# Patient Record
Sex: MALE | Race: WHITE | NOT HISPANIC OR LATINO | Employment: OTHER | URBAN - METROPOLITAN AREA
[De-identification: names, ages, dates, MRNs, and addresses within clinical notes are randomized per-mention and may not be internally consistent; named-entity substitution may affect disease eponyms.]

---

## 2017-08-14 ENCOUNTER — OFFICE VISIT (OUTPATIENT)
Dept: OPHTHALMOLOGY | Facility: CLINIC | Age: 67
End: 2017-08-14
Attending: OPHTHALMOLOGY
Payer: MEDICARE

## 2017-08-14 DIAGNOSIS — H52.4 MYOPIA WITH ASTIGMATISM AND PRESBYOPIA, BILATERAL: ICD-10-CM

## 2017-08-14 DIAGNOSIS — H04.123 BILATERAL DRY EYES: Primary | ICD-10-CM

## 2017-08-14 DIAGNOSIS — H01.00B BLEPHARITIS OF UPPER AND LOWER EYELIDS OF BOTH EYES, UNSPECIFIED TYPE: ICD-10-CM

## 2017-08-14 DIAGNOSIS — H52.13 MYOPIA WITH ASTIGMATISM AND PRESBYOPIA, BILATERAL: ICD-10-CM

## 2017-08-14 DIAGNOSIS — H52.203 MYOPIA WITH ASTIGMATISM AND PRESBYOPIA, BILATERAL: ICD-10-CM

## 2017-08-14 DIAGNOSIS — H01.00A BLEPHARITIS OF UPPER AND LOWER EYELIDS OF BOTH EYES, UNSPECIFIED TYPE: ICD-10-CM

## 2017-08-14 PROCEDURE — 68761 CLOSE TEAR DUCT OPENING: CPT | Mod: ZF | Performed by: OPHTHALMOLOGY

## 2017-08-14 PROCEDURE — 99213 OFFICE O/P EST LOW 20 MIN: CPT | Mod: ZF

## 2017-08-14 ASSESSMENT — EXTERNAL EXAM - RIGHT EYE: OD_EXAM: NORMAL

## 2017-08-14 ASSESSMENT — REFRACTION_WEARINGRX
OS_CYLINDER: +2.00
OD_AXIS: 150
OD_ADD: +2.50
OS_ADD: +2.50
OD_SPHERE: -3.25
OS_AXIS: 177
OD_CYLINDER: +0.75
OS_SPHERE: -5.75

## 2017-08-14 ASSESSMENT — EXTERNAL EXAM - LEFT EYE: OS_EXAM: NORMAL

## 2017-08-14 ASSESSMENT — VISUAL ACUITY
OD_CC: 20/20
METHOD: SNELLEN - LINEAR
OS_CC: 20/25

## 2017-08-14 ASSESSMENT — TONOMETRY
IOP_METHOD: TONOPEN
OS_IOP_MMHG: 22
OD_IOP_MMHG: 21

## 2017-08-14 ASSESSMENT — CONF VISUAL FIELD
OD_NORMAL: 1
OS_NORMAL: 1

## 2017-08-14 NOTE — MR AVS SNAPSHOT
After Visit Summary   8/14/2017    Karri Brady    MRN: [id number]           Patient Information     Date Of Birth          1950        Visit Information        Provider Department      8/14/2017 7:30 AM Pretty Lomax [facility]        Today's Diagnoses     Bilateral dry eyes    -  1    Myopia with astigmatism and presbyopia, bilateral        Blepharitis of upper and lower eyelids of both eyes, unspecified type           Follow-ups after your visit        Follow-up notes from your care team     Return in about 1 year (around 8/14/2018).      Who to contact     Please call your clinic at [phone] to:    Ask questions about your health    Make or cancel appointments    Discuss your medicines    Learn about your test results    Speak to your doctor   If you have compliments or concerns about an experience at your clinic, or if you wish to file a complaint, please contact [facility] Patient Relations at [phone] or email us at [email or web address]         Additional Information About Your Visit        MyChart Information     [employer] gives you secure access to your electronic health record. If you see a primary care provider, you can also send messages to your care team and make appointments. If you have questions, please call your primary care clinic.  If you do not have a primary care provider, please call [phone] and they will assist you.      [employer] is an electronic gateway that provides easy, online access to your medical records. With [employer], you can request a clinic appointment, read your test results, renew a prescription or communicate with your care team.     To access your existing account, please contact your [facility] or call [phone] for assistance.        Care EveryWhere ID     This is your Care EveryWhere ID. This could be used by other organizations to access your [facility]  records  [id number]         Blood Pressure from Last 3 Encounters:   04/30/13 116/64   04/23/13 116/74   03/27/13 118/60    Weight from Last 3 Encounters:   04/30/13 71.8 kg (158 lb 3.2 oz)   04/19/13 72.1 kg (158 lb 15.2 oz)   03/27/13 71.7 kg (158 lb)              We Performed the Following     Punctal Closure, Plugs          Today's Medication Changes          These changes are accurate as of: 8/14/17  9:04 AM.  If you have any questions, ask your nurse or doctor.               Start taking these medicines.        Dose/Directions    Lifitegrast 5 % Soln   Used for:  Bilateral dry eyes   Started by:  Pretty Lomax MD        Dose:  1 drop   Apply 1 drop to eye 2 times daily   Quantity:  1 each   Refills:  11         Stop taking these medicines if you haven't already. Please contact your care team if you have questions.     cycloSPORINE 0.05 % ophthalmic emulsion   Commonly known as:  RESTASIS   Stopped by:  Pretty Lomax MD                Where to get your medicines      Some of these will need a paper prescription and others can be bought over the counter.  Ask your nurse if you have questions.     Bring a paper prescription for each of these medications     Lifitegrast 5 % Soln                Primary Care Provider Office Phone # Fax #    Dom ELIZONDO Evelia Stone [phone]       [address] [address]        Equal Access to Services     [facility] Center[facility] AH: Hadii bethel verduzco hadasho Sowilfredali, waaxda luqadaha, qaybta kaalmada giada, waxsuyapa beba bush . So [facility] [phone].    ATENCIÓN: Si habla español, tiene a burr disposición servicios gratuitos de asistencia lingüística. Llame al [phone].    We comply with applicable federal civil rights laws and [place] laws. We do not discriminate on the basis of race, color, national origin, age, disability sex, sexual orientation or gender identity.            Thank you!     Thank you for  choosing EYE CLINIC  for your care. Our goal is always to provide you with excellent care. Hearing back from our patients is one way we can continue to improve our services. Please take a few minutes to complete the written survey that you may receive in the mail after your visit with us. Thank you!             Your Updated Medication List - Protect others around you: Learn how to safely use, store and throw away your medicines at www.disposemymeds.org.          This list is accurate as of: 8/14/17  9:04 AM.  Always use your most recent med list.                   Brand Name Dispense Instructions for use Diagnosis    DERMA-SMOOTHE/FS SCALP 0.01 % Oil     118 mL    Apply once daily at night to scalp, decrease to once weekly if you are not flaring    Dermatitis, seborrheic       ketoconazole 2 % cream    NIZORAL    15 g    Apply 3 x weekly to base of scalp when hair is wet and massage in, leave in place for approximately 5-10 minutes then wash out.    Dermatitis, seborrheic       Lifitegrast 5 % Soln     1 each    Apply 1 drop to eye 2 times daily    Bilateral dry eyes       SYSTANE OP      Apply 1 drop to eye as needed

## 2017-08-14 NOTE — NURSING NOTE
Chief Complaints and History of Present Illnesses   Patient presents with     Follow Up For     Blepharitis      HPI    Affected eye(s):  Both   Symptoms:     No foreign body sensation   Tearing   Itching   No burning       Other:  Numerous years    Frequency:  Intermittent, Constant       Do you have eye pain now?:  No      Comments:  Pt complains of itching eyes, a lot of tearing. Notes that he has been using Restasis drops with no relief. Has not used any eye medications since 4-2017. Currently not using warm compresses, hard to do daily basis. Vision is good, happy with current glasses. [facility]5 months [facility]. Leaving for [place] next week. Would like another opinion on itchy eye lids. ANDRES VALENTIN, COA 7:54 AM 08/14/2017

## 2017-08-14 NOTE — PROGRESS NOTES
HPI  Karri Brady is a 66 year old male here for follow-up of blepharitis. Reports symptoms as itching starting in 2013 along with scalp itching, scalp itching has passed, persistent eye itching OS > OD, intermittent. Used restasis in the past, it was not helping and it was starting to burn. Used ATs, stopped because it was not helping. Warm compress helps soothe eyes, though he is not consistent in its use. Denies new floaters or flashing lights.      POH:   - Refractive error  - glaucoma suspect   - vertical misalignment s/p left eye correction 1975   PMH: Rosacea, seborrheic dermatitis. No diabetes or HTN  FH: No AMD or glc  SH: Former smoker  Ocular meds: none     Assessment & Plan    (H04.123) Bilateral dry eyes  (primary encounter diagnosis)  (H01.001,  H01.005,  H01.004,  H01.002) Blepharitis of upper and lower eyelids of both eyes, unspecified typeComment: Has attempted multiple treatments without much improvement, though has trouble with consistency   Plan: replace left punctual plug today (0.8 mm LLL), start ATs BID, warm compress BID  He has not tolerated Restasis (burning without improvement in symptoms), can try Xiidra BID - discount card given, also [facility]    (H52.13,  H52.203,  H52.4) Myopia with astigmatism and presbyopia, bilateral  Comment: good vision today  Plan: Monitor     He spends most of the year in [place] and is followed at the [facility].  Return to general clinic here when he is back in town in 1 year.      Teaching statement:  Complete documentation of historical and exam elements from today's encounter can be found in the full encounter summary report (not reduplicated in this progress note). I personally obtained the chief complaint(s) and history of present illness.  I confirmed and edited as necessary the review of systems, past medical/surgical history, family history, social history, and examination findings as documented by others; and I examined  the patient myself. I personally reviewed the relevant tests, images, and reports as documented above.     I formulated and edited as necessary the assessment and plan and discussed the findings and management plan with the patient and family.      Pretty Lomax MD  Comprehensive Ophthalmology & Ocular Pathology  Department of Ophthalmology and Visual Neurosciences  [email or web address]  Pager [phone]

## 2018-10-10 ENCOUNTER — RECORDS - HEALTHEAST (OUTPATIENT)
Dept: ADMINISTRATIVE | Facility: OTHER | Age: 68
End: 2018-10-10

## 2018-10-31 ENCOUNTER — RECORDS - HEALTHEAST (OUTPATIENT)
Dept: ADMINISTRATIVE | Facility: OTHER | Age: 68
End: 2018-10-31

## 2018-12-11 ENCOUNTER — TRANSFERRED RECORDS (OUTPATIENT)
Dept: HEALTH INFORMATION MANAGEMENT | Facility: CLINIC | Age: 68
End: 2018-12-11

## 2018-12-13 ENCOUNTER — RECORDS - HEALTHEAST (OUTPATIENT)
Dept: ADMINISTRATIVE | Facility: OTHER | Age: 68
End: 2018-12-13

## 2018-12-22 ENCOUNTER — TRANSFERRED RECORDS (OUTPATIENT)
Dept: HEALTH INFORMATION MANAGEMENT | Facility: CLINIC | Age: 68
End: 2018-12-22

## 2019-01-14 ENCOUNTER — TRANSFERRED RECORDS (OUTPATIENT)
Dept: HEALTH INFORMATION MANAGEMENT | Facility: CLINIC | Age: 69
End: 2019-01-14

## 2019-01-14 ENCOUNTER — RECORDS - HEALTHEAST (OUTPATIENT)
Dept: ADMINISTRATIVE | Facility: OTHER | Age: 69
End: 2019-01-14

## 2019-01-16 ENCOUNTER — RECORDS - HEALTHEAST (OUTPATIENT)
Dept: ADMINISTRATIVE | Facility: OTHER | Age: 69
End: 2019-01-16
Payer: MEDICARE

## 2019-02-11 ENCOUNTER — RECORDS - HEALTHEAST (OUTPATIENT)
Dept: ADMINISTRATIVE | Facility: OTHER | Age: 69
End: 2019-02-11

## 2019-02-13 ENCOUNTER — TRANSFERRED RECORDS (OUTPATIENT)
Dept: HEALTH INFORMATION MANAGEMENT | Facility: CLINIC | Age: 69
End: 2019-02-13

## 2019-02-27 ENCOUNTER — RECORDS - HEALTHEAST (OUTPATIENT)
Dept: ADMINISTRATIVE | Facility: OTHER | Age: 69
End: 2019-02-27

## 2019-05-29 ENCOUNTER — COMMUNICATION - HEALTHEAST (OUTPATIENT)
Dept: SCHEDULING | Facility: CLINIC | Age: 69
End: 2019-05-29

## 2019-05-29 ENCOUNTER — RECORDS - HEALTHEAST (OUTPATIENT)
Dept: ADMINISTRATIVE | Facility: OTHER | Age: 69
End: 2019-05-29

## 2019-06-03 ENCOUNTER — OFFICE VISIT - HEALTHEAST (OUTPATIENT)
Dept: FAMILY MEDICINE | Facility: CLINIC | Age: 69
End: 2019-06-03

## 2019-06-03 DIAGNOSIS — Z01.818 ENCOUNTER FOR PREOPERATIVE EXAMINATION FOR GENERAL SURGICAL PROCEDURE: ICD-10-CM

## 2019-06-03 DIAGNOSIS — E78.49 OTHER HYPERLIPIDEMIA: ICD-10-CM

## 2019-06-03 DIAGNOSIS — G56.01 CARPAL TUNNEL SYNDROME OF RIGHT WRIST: ICD-10-CM

## 2019-06-03 LAB
ALBUMIN SERPL-MCNC: 4.3 G/DL (ref 3.5–5)
ALP SERPL-CCNC: 66 U/L (ref 45–120)
ALT SERPL W P-5'-P-CCNC: 63 U/L (ref 0–45)
ANION GAP SERPL CALCULATED.3IONS-SCNC: 11 MMOL/L (ref 5–18)
AST SERPL W P-5'-P-CCNC: 41 U/L (ref 0–40)
BILIRUB SERPL-MCNC: 0.5 MG/DL (ref 0–1)
BUN SERPL-MCNC: 14 MG/DL (ref 8–22)
CALCIUM SERPL-MCNC: 9.7 MG/DL (ref 8.5–10.5)
CHLORIDE BLD-SCNC: 104 MMOL/L (ref 98–107)
CHOLEST SERPL-MCNC: 156 MG/DL
CO2 SERPL-SCNC: 24 MMOL/L (ref 22–31)
CREAT SERPL-MCNC: 0.82 MG/DL (ref 0.7–1.3)
FASTING STATUS PATIENT QL REPORTED: YES
GFR SERPL CREATININE-BSD FRML MDRD: >60 ML/MIN/1.73M2
GLUCOSE BLD-MCNC: 88 MG/DL (ref 70–125)
HDLC SERPL-MCNC: 44 MG/DL
LDLC SERPL CALC-MCNC: 100 MG/DL
POTASSIUM BLD-SCNC: 4.3 MMOL/L (ref 3.5–5)
PROT SERPL-MCNC: 7.1 G/DL (ref 6–8)
SODIUM SERPL-SCNC: 139 MMOL/L (ref 136–145)
TRIGL SERPL-MCNC: 60 MG/DL

## 2019-06-03 ASSESSMENT — MIFFLIN-ST. JEOR: SCORE: 1489.92

## 2019-06-05 ENCOUNTER — AMBULATORY - HEALTHEAST (OUTPATIENT)
Dept: FAMILY MEDICINE | Facility: CLINIC | Age: 69
End: 2019-06-05

## 2019-06-05 DIAGNOSIS — R74.8 ELEVATED LIVER ENZYMES: ICD-10-CM

## 2019-06-10 ENCOUNTER — COMMUNICATION - HEALTHEAST (OUTPATIENT)
Dept: SCHEDULING | Facility: CLINIC | Age: 69
End: 2019-06-10

## 2019-06-18 ENCOUNTER — RECORDS - HEALTHEAST (OUTPATIENT)
Dept: ADMINISTRATIVE | Facility: OTHER | Age: 69
End: 2019-06-18

## 2019-07-11 ENCOUNTER — COMMUNICATION - HEALTHEAST (OUTPATIENT)
Dept: SCHEDULING | Facility: CLINIC | Age: 69
End: 2019-07-11

## 2019-07-12 ENCOUNTER — OFFICE VISIT - HEALTHEAST (OUTPATIENT)
Dept: FAMILY MEDICINE | Facility: CLINIC | Age: 69
End: 2019-07-12

## 2019-07-12 DIAGNOSIS — R74.8 ELEVATED LIVER ENZYMES: ICD-10-CM

## 2019-07-12 DIAGNOSIS — R07.81 RIB PAIN ON RIGHT SIDE: ICD-10-CM

## 2019-07-12 ASSESSMENT — MIFFLIN-ST. JEOR: SCORE: 1515.61

## 2019-07-15 ENCOUNTER — COMMUNICATION - HEALTHEAST (OUTPATIENT)
Dept: FAMILY MEDICINE | Facility: CLINIC | Age: 69
End: 2019-07-15

## 2019-07-16 ENCOUNTER — COMMUNICATION - HEALTHEAST (OUTPATIENT)
Dept: FAMILY MEDICINE | Facility: CLINIC | Age: 69
End: 2019-07-16

## 2019-07-17 ENCOUNTER — AMBULATORY - HEALTHEAST (OUTPATIENT)
Dept: FAMILY MEDICINE | Facility: CLINIC | Age: 69
End: 2019-07-17

## 2019-07-17 DIAGNOSIS — R53.83 FATIGUE, UNSPECIFIED TYPE: ICD-10-CM

## 2019-07-17 DIAGNOSIS — Z91.018 FOOD ALLERGY: ICD-10-CM

## 2019-07-21 ENCOUNTER — COMMUNICATION - HEALTHEAST (OUTPATIENT)
Dept: FAMILY MEDICINE | Facility: CLINIC | Age: 69
End: 2019-07-21

## 2019-08-09 ENCOUNTER — TRANSFERRED RECORDS (OUTPATIENT)
Dept: HEALTH INFORMATION MANAGEMENT | Facility: CLINIC | Age: 69
End: 2019-08-09

## 2019-08-21 ENCOUNTER — RECORDS - HEALTHEAST (OUTPATIENT)
Dept: ADMINISTRATIVE | Facility: OTHER | Age: 69
End: 2019-08-21

## 2019-10-02 ENCOUNTER — HEALTH MAINTENANCE LETTER (OUTPATIENT)
Age: 69
End: 2019-10-02

## 2019-10-16 ENCOUNTER — RECORDS - HEALTHEAST (OUTPATIENT)
Dept: ADMINISTRATIVE | Facility: OTHER | Age: 69
End: 2019-10-16

## 2019-10-18 ENCOUNTER — RECORDS - HEALTHEAST (OUTPATIENT)
Dept: ADMINISTRATIVE | Facility: OTHER | Age: 69
End: 2019-10-18

## 2019-10-22 ENCOUNTER — TRANSFERRED RECORDS (OUTPATIENT)
Dept: HEALTH INFORMATION MANAGEMENT | Facility: CLINIC | Age: 69
End: 2019-10-22

## 2019-12-15 ENCOUNTER — HEALTH MAINTENANCE LETTER (OUTPATIENT)
Age: 69
End: 2019-12-15

## 2020-02-26 ENCOUNTER — TRANSFERRED RECORDS (OUTPATIENT)
Dept: HEALTH INFORMATION MANAGEMENT | Facility: CLINIC | Age: 70
End: 2020-02-26

## 2020-03-30 ENCOUNTER — COMMUNICATION - HEALTHEAST (OUTPATIENT)
Dept: FAMILY MEDICINE | Facility: CLINIC | Age: 70
End: 2020-03-30

## 2020-03-30 DIAGNOSIS — R12 HEARTBURN: ICD-10-CM

## 2020-04-22 ENCOUNTER — COMMUNICATION - HEALTHEAST (OUTPATIENT)
Dept: FAMILY MEDICINE | Facility: CLINIC | Age: 70
End: 2020-04-22

## 2020-04-22 ENCOUNTER — TRANSFERRED RECORDS (OUTPATIENT)
Dept: HEALTH INFORMATION MANAGEMENT | Facility: CLINIC | Age: 70
End: 2020-04-22

## 2020-04-22 DIAGNOSIS — E04.1 THYROID NODULE: ICD-10-CM

## 2020-04-23 ENCOUNTER — COMMUNICATION - HEALTHEAST (OUTPATIENT)
Dept: ADMINISTRATIVE | Facility: CLINIC | Age: 70
End: 2020-04-23

## 2020-04-23 ENCOUNTER — TRANSCRIBE ORDERS (OUTPATIENT)
Dept: OTHER | Age: 70
End: 2020-04-23

## 2020-04-23 DIAGNOSIS — E04.1 THYROID NODULE: Primary | ICD-10-CM

## 2020-04-24 ENCOUNTER — TELEPHONE (OUTPATIENT)
Dept: ENDOCRINOLOGY | Facility: CLINIC | Age: 70
End: 2020-04-24

## 2020-04-24 NOTE — TELEPHONE ENCOUNTER
[facility] Call Center    Phone Message    May a detailed message be left on voicemail: yes    Reason for Call: Other: NP referred t Endocrinology for Thyroid Noldule referred by Dom Stone [facility]    Action Taken: Message routed to:  [facility] Endocrinology    Travel Screening: Not Applicable

## 2020-04-27 NOTE — TELEPHONE ENCOUNTER
RECORDS RECEIVED FROM: External   DATE RECEIVED: 4/29/20   NOTES (FOR ALL VISITS) STATUS DETAILS   OFFICE NOTES from referring provider [facility]  4/22/20 encounter   OFFICE NOTES from other specialist N/A    ED NOTES N/A    OPERATIVE REPORT  (thyroid, pituitary, adrenal, parathyroid) N/A    MEDICATION LIST Care Everywhere    IMAGING      DEXASCAN N/A    MRI (BRAIN) N/A    XR (Chest) N/A    CT (HEAD/NECK/CHEST/ABDOMEN) N/A    NUCLEAR  N/A    ULTRASOUND (HEAD/NECK) N/A    LABS     DIABETES: HBGA1C, CREATININE, FASTING LIPIDS, MICROALBUMIN URINE, POTASSIUM, TSH, T4    THYROID: TSH, T4, CBC, THYRODLONULIN, TOTAL T3, FREE T4, CALCITONIN, CEA Care Everywhere   [facility]:  6/3/19

## 2020-04-27 NOTE — TELEPHONE ENCOUNTER
CLINIC COORDINATOR SCHEDULING NOTES    CALL RESULT: spoke with pt, confirmed appt    APPT TYPE: VIDEO VISIT NEW    PROVIDER: Patel SANTANA APPT NEEDED: scheduled for 4/28 at 5pm

## 2020-04-28 RX ORDER — ATORVASTATIN CALCIUM 40 MG/1
80 TABLET, FILM COATED ORAL DAILY
COMMUNITY

## 2020-04-28 RX ORDER — MELOXICAM 7.5 MG/1
TABLET ORAL PRN
COMMUNITY
Start: 2019-11-26

## 2020-04-28 RX ORDER — GABAPENTIN 300 MG/1
600 CAPSULE ORAL
COMMUNITY

## 2020-04-28 NOTE — PROGRESS NOTES
"Karri Brady is a 69 year old male who is being evaluated via a billable video visit.      The patient has been notified of following:     \"This video visit will be conducted via a call between you and your physician/provider. We have found that certain health care needs can be provided without the need for an in-person physical exam.  This service lets us provide the care you need with a video conversation.  If a prescription is necessary we can send it directly to your pharmacy.  If lab work is needed we can place an order for that and you can then stop by our lab to have the test done at a later time.    Video visits are billed at different rates depending on your insurance coverage.  Please reach out to your insurance provider with any questions.    If during the course of the call the physician/provider feels a video visit is not appropriate, you will not be charged for this service.\"    Patient has given verbal consent for Video visit? Yes    How would you like to obtain your AVS? Vinodharliza    Patient would like the video invitation sent by: Send to e-mail at: [email or web address]      Video-Visit Details    Type of service:  Video Visit    Distant Location (provider location):  [facility] ENDOCRINOLOGY     Mode of Communication:  Video Conference via Vamsi Roy MA        "

## 2020-04-29 ENCOUNTER — VIRTUAL VISIT (OUTPATIENT)
Dept: ENDOCRINOLOGY | Facility: CLINIC | Age: 70
End: 2020-04-29
Attending: FAMILY MEDICINE
Payer: MEDICARE

## 2020-04-29 ENCOUNTER — PRE VISIT (OUTPATIENT)
Dept: ENDOCRINOLOGY | Facility: CLINIC | Age: 70
End: 2020-04-29

## 2020-04-29 ENCOUNTER — TELEPHONE (OUTPATIENT)
Dept: ENDOCRINOLOGY | Facility: CLINIC | Age: 70
End: 2020-04-29

## 2020-04-29 DIAGNOSIS — E04.1 THYROID NODULE: Primary | ICD-10-CM

## 2020-04-29 NOTE — PATIENT INSTRUCTIONS
[phone] is the clinic team phone number      Release of information for reports and  images from past studies including all thyroid US, labs, cytology     We will get the thyroid  US scheduled when radiology opens up post [employer] .  I will send the results through [employer].  You could also schedule a follow up telephone or video visit after you get the US so we can discuss the results.       INFORMATION FOR PATIENTS  THYROID NODULES AND   FINE NEEDLE ASPIRATION BIOPSY OF THE THYROID    The finding of a thyroid nodule is almost never an emergency.  Thyroid nodules are common, occurring in up to 50% of patients over the age of 50 years.      Innocent (not important enough to have been detected during life) thyroid cancer may be found in around 5% of people.   Likewise, about 5-10% of patients with demonstrated thyroid nodules will prove to have thyroid cancer if subjected to aggressive diagnostic measures.  As a rule, thyroid cancer has an excellent prognosis.  Therefore, in each patient with thyroid nodules, the decision has to be made about how important it is to identify and treat a cancer, if present.      When we find nodules on the thyroid we use ultrasound and either palpation or ultrasound guided biopsy to help determine which patients, from the large number with nodules on the thyroid, are in the group containing an important thyroid cancer.      Ultrasound Guided Fine Needle Aspiration Biopsy of the Thyroid uses the ultrasound eye to see the nodule and the needle during the biopsy procedure.  This procedure is performed in the radiology department.  The radiologist uses a small needle to remove cells from the specific area of the thyroid. The cells are then analyzed under the microscope to determine whether or not they might be cancer.      The results of thyroid biopsy come in 4 categories    1. Benign or negative for cancer.  This is most common result, found in approximately 60-70% of biopsy specimens.   There remains a 5% chance that this result is wrong.    2. Positive for cancer.  This is found about 5 % of the time. There remains a  1% chance that cancer is not present when we make this diagnosis by biopsy. This result leads to a recommendation for surgery to make the final diagnosis of cancer.     3. Indeterminate, or the grey zone.  This is found in approximately 20% of patients.  This diagnosis identifies a higher risk group, often resulting in diagnostic surgery to establish the final diagnosis.  If all patients in this group go to surgery, only 10-20%, on average, prove to have cancer.     4. Insufficient for diagnosis. This is found in 5-10% of biopsies. When this occurs we need to repeat the biopsy.      The greatest risk of thyroid biopsy is that the result is not clear (that it is in the indeterminate grey zone group), resulting in future thyroid surgery for what is likely to be benign thyroid disease.  There is a small risk of bleeding or infection.      If your doctor has recommended you have an ultrasound guided fine needle aspiration biopsy of the thyroid, your appointment may be scheduled by calling [phone].  Be sure to specify that the procedure is to be ultrasound-guided and that it is to be scheduled in radiology*

## 2020-04-29 NOTE — PROGRESS NOTES
"Endocrine Consult video visitnote    Attending Assessment/Plan :     History of thyroid nodules . We do not have any of the relevant records.  He is currently asymptomatic.   This is not currently urgent and it should wait until he can more safely get the study  [facility] / images from past studies including thyroid US, labs, cytology   Thyroid US here post covid - see me afterwards    Due to the COVID 19 pandemic this visit was converted to a telephone visit in order to help prevent spread of infection in this high risk patient and the general population. The patient gave verbal consent for the telephone visit today.    Start time 1700 AMwel -multiple tries without connection ; doximity  508 - connected.   Stop time 1733  Total time 33 minutes     Tabatha Sweeney MD    Chief complaint:  Karri is a 69 year old male seen in consultation at the request of Dr Dom Altamirano [facility]   for thyroid nodule .  I have reviewed Care Everywhere including  [facility] lab reports, imaging reports and provider notes as indicated.      HISTORY OF PRESENT ILLNESS  Karri says the thyroid  Was initially being followed at the [facility] .  He has had thyroid tests including US.  He has 2 \"enlarged nodes\" .  He had FNAB in [place].  He was getting follow up every 6-12 months.  The last US showed that one has grown a little bit. He last had thyroid US at the [facility] around one year ago.  He has never had FNAB at the [facility].    One week ago [facility] He is not satisfied with their speed of anticipated follow up US, current delays due to COVID.  He is also seeking a second opinion.      We have the following labs:    4/26/13: Ca 8.4, creatinine 0.65    There are no thyroid images on the system    He had no known childhood radiation exposure.  A Sister had a thyorid operation. He doesn't think she had cancer but he isn't sure.  He had tonsillectomy 1960 age 10    REVIEW OF SYSTEMS  No voice symptoms  No " throat symptoms  Occasional slight irritation of the throat  Swallow is OK   He cannot see the lumps in the mirror  He can't feel them with his hand  Chronic pain condition  Pain going up arm into shoulder  TC neurology started gabapentin which helped the chronic pain issue  Scheduled for hand surgery on 5/11/2020- he will get COVID test in preparation.  The surgery will be at [address] [place] [address] [place]  Gets trigger fingers x 6-7 in the past - hands are extremely painful   He had recent trial of hydroxychloroquin for possible RA.   -- doesn't think he has RA, has seen a few rheumatologists  10 system ROS otherwise as per the HPI or negative    Past Medical History  Past Medical History:   Diagnosis Date     Hyperlipidemia      Impaired glucose tolerance      Injury, other and unspecified, unspecified site 1998    Fracture left distal radius,Achilles tendon rupture.     Osteoarthritis      Overweight(278.02)      Strabismus      Thyroid nodule      Past Surgical History:   Procedure Laterality Date     ARTHROPLASTY MINIMALLY INVASIVE HIP  4/19/2013    Procedure: ARTHROPLASTY MINIMALLY INVASIVE HIP;  Right Two Incision Total Hip Arthroplasty;  Surgeon: Steve Lopez MD;  Location: [facility] OR     C LEG/ANKLE SURGERY PROC UNLISTED      Ankle arthroscopy, diagnostic     C SHLDR ARTHROSCOP,DIAGNOSTIC      Right shoulder     C SHOULDER SURG PROC UNLISTED      Shoulder Procedure Unlisted     HC CORRECT BUNION,SIMPLE      Bunionectomy, NOS     HC REMOVAL HEEL SPUR, CALCANEUS       HC REPAIR ACHILLES TENDON,PRIMARY       HC REPAIR OF NASAL SEPTUM       left hip replacement      2006 by Dr Lopez     REMOVAL OF SPERM DUCT(S)  1985    Vasectomy     STRABISMUS SURGERY       TONSILLECTOMY  1960     Medications    Current Outpatient Medications   Medication Sig Dispense Refill     atorvastatin (LIPITOR) 40 MG tablet Take 80 mg by mouth daily       gabapentin (NEURONTIN) 300 MG capsule TAKE 2 CAPSULES BY MOUTH TWICE A DAY  AND TAKE 3  "CAPSULES BY MOUTH AT BEDTIME       meloxicam (MOBIC) 7.5 MG tablet as needed       Allergies  Allergies   Allergen Reactions     Bee Venom Swelling     No Known Drug Allergies      Family History  family history includes Amblyopia in his son; Arthritis in his maternal grandmother; Cancer in his maternal grandfather; Cardiovascular in his father and maternal grandfather; Cerebrovascular Disease in his father; Rheumatic fever in his mother; Rheumatoid Arthritis in his mother; Thyroid Disease in his sister.    Social History  Social History     Tobacco Use     Smoking status: Former Smoker     Packs/day: 2.00     Years: 5.00     Pack years: 10.00     Types: Cigarettes     Smokeless tobacco: Never Used     Tobacco comment: quit at age 22   Substance Use Topics     Alcohol use: Yes     Comment: ocassionally     Drug use: No    8/2018 returned from [place]    Physical Exam  There were no vitals taken for this visit.  There is no height or weight on file to calculate BMI.   BP Readings from Last 1 Encounters:   04/30/13 116/64      Pulse Readings from Last 1 Encounters:   04/30/13 56      Resp Readings from Last 1 Encounters:   04/30/13 12      Temp Readings from Last 1 Encounters:   04/30/13 99  F (37.2  C) (Oral)      SpO2 Readings from Last 1 Encounters:   04/23/13 98%      Wt Readings from Last 1 Encounters:   04/30/13 71.8 kg (158 lb 3.2 oz)      Ht Readings from Last 1 Encounters:   04/30/13 1.676 m (5' 6\")     GENERAL: healthy, alert and no distress; normal voice  EYES: Eyes grossly normal to inspection, conjunctivae and sclerae normal  NECK: no visible suggestion of goiter  RESP: no audible wheeze, cough, or visible cyanosis.  No visible retractions or increased work of breathing.  Able to speak fully in complete sentences.  NEURO: Cranial nerves grossly intact, mentation intact and speech normal  PSYCH: mentation appears normal, affect normal/bright, normal speech and appearance well-groomed          "

## 2020-04-29 NOTE — TELEPHONE ENCOUNTER
[facility] Call Center    Phone Message    May a detailed message be left on voicemail: no     Reason for Call: Other: . [facility] and [facility] . Thank you     Action Taken: Message routed to:  [facility] endo    Travel Screening: Not Applicable

## 2020-05-04 ENCOUNTER — OFFICE VISIT - HEALTHEAST (OUTPATIENT)
Dept: FAMILY MEDICINE | Facility: CLINIC | Age: 70
End: 2020-05-04

## 2020-05-04 DIAGNOSIS — Z01.818 PREOP GENERAL PHYSICAL EXAM: ICD-10-CM

## 2020-05-04 DIAGNOSIS — M65.30 TRIGGER FINGER, ACQUIRED: ICD-10-CM

## 2020-05-04 DIAGNOSIS — G62.9 PERIPHERAL POLYNEUROPATHY: ICD-10-CM

## 2020-05-04 LAB
ANION GAP SERPL CALCULATED.3IONS-SCNC: 10 MMOL/L (ref 5–18)
ATRIAL RATE - MUSE: 53 BPM
BUN SERPL-MCNC: 11 MG/DL (ref 8–22)
CALCIUM SERPL-MCNC: 8.9 MG/DL (ref 8.5–10.5)
CHLORIDE BLD-SCNC: 106 MMOL/L (ref 98–107)
CO2 SERPL-SCNC: 23 MMOL/L (ref 22–31)
CREAT SERPL-MCNC: 0.71 MG/DL (ref 0.7–1.3)
DIASTOLIC BLOOD PRESSURE - MUSE: NORMAL
GFR SERPL CREATININE-BSD FRML MDRD: >60 ML/MIN/1.73M2
GLUCOSE BLD-MCNC: 87 MG/DL (ref 70–125)
HGB BLD-MCNC: 15.1 G/DL (ref 14–18)
INTERPRETATION ECG - MUSE: NORMAL
P AXIS - MUSE: 49 DEGREES
POTASSIUM BLD-SCNC: 4 MMOL/L (ref 3.5–5)
PR INTERVAL - MUSE: 154 MS
QRS DURATION - MUSE: 96 MS
QT - MUSE: 424 MS
QTC - MUSE: 397 MS
R AXIS - MUSE: 31 DEGREES
SODIUM SERPL-SCNC: 139 MMOL/L (ref 136–145)
SYSTOLIC BLOOD PRESSURE - MUSE: NORMAL
T AXIS - MUSE: 29 DEGREES
VENTRICULAR RATE- MUSE: 53 BPM

## 2020-05-04 ASSESSMENT — MIFFLIN-ST. JEOR: SCORE: 1486.13

## 2020-05-12 NOTE — TELEPHONE ENCOUNTER
Patient has signed [facility] records can be requested via fax at this fax number provided.I have requested for images to be pushed through PACS.

## 2020-05-22 ENCOUNTER — RECORDS - HEALTHEAST (OUTPATIENT)
Dept: ADMINISTRATIVE | Facility: OTHER | Age: 70
End: 2020-05-22

## 2020-06-29 ENCOUNTER — RECORDS - HEALTHEAST (OUTPATIENT)
Dept: ADMINISTRATIVE | Facility: OTHER | Age: 70
End: 2020-06-29

## 2020-07-09 ENCOUNTER — RECORDS - HEALTHEAST (OUTPATIENT)
Dept: ADMINISTRATIVE | Facility: OTHER | Age: 70
End: 2020-07-09

## 2020-07-10 ENCOUNTER — RECORDS - HEALTHEAST (OUTPATIENT)
Dept: ADMINISTRATIVE | Facility: OTHER | Age: 70
End: 2020-07-10
Payer: MEDICARE

## 2020-07-24 ENCOUNTER — RECORDS - HEALTHEAST (OUTPATIENT)
Dept: ADMINISTRATIVE | Facility: OTHER | Age: 70
End: 2020-07-24

## 2020-08-20 ENCOUNTER — RECORDS - HEALTHEAST (OUTPATIENT)
Dept: ADMINISTRATIVE | Facility: OTHER | Age: 70
End: 2020-08-20

## 2020-09-25 ENCOUNTER — RECORDS - HEALTHEAST (OUTPATIENT)
Dept: ADMINISTRATIVE | Facility: OTHER | Age: 70
End: 2020-09-25

## 2020-10-19 ENCOUNTER — COMMUNICATION - HEALTHEAST (OUTPATIENT)
Dept: FAMILY MEDICINE | Facility: CLINIC | Age: 70
End: 2020-10-19

## 2020-10-20 ENCOUNTER — COMMUNICATION - HEALTHEAST (OUTPATIENT)
Dept: FAMILY MEDICINE | Facility: CLINIC | Age: 70
End: 2020-10-20

## 2020-11-24 ENCOUNTER — TRANSFERRED RECORDS (OUTPATIENT)
Dept: HEALTH INFORMATION MANAGEMENT | Facility: CLINIC | Age: 70
End: 2020-11-24

## 2021-01-15 ENCOUNTER — HEALTH MAINTENANCE LETTER (OUTPATIENT)
Age: 71
End: 2021-01-15

## 2021-04-22 ENCOUNTER — TRANSFERRED RECORDS (OUTPATIENT)
Dept: HEALTH INFORMATION MANAGEMENT | Facility: CLINIC | Age: 71
End: 2021-04-22

## 2021-05-29 NOTE — TELEPHONE ENCOUNTER
"Triage note:    68 year old male called about generalized pain since 2019 and been progressively worsening. The pain is  in bilateral hands and arms. Right arm is worse than left. The pain also radiates into his legs as well. The pain was an   \"8\" on a scale of 1-10.  He has been taking both Tylenol almost 4000 mg daily and Ibuprofen (400 mg three times a day) which helped reduce the pain to a \"5-6\".  The pain is worse in the morning.     Just before this pain issue started [facility] and he developed a  'knotted\" muscle in his back early 2019.  He was treated with a  muscle relaxant which helped and the issue resolved.      He had a Pre op last Monday and learned that he had increased liver enzymes. He states he doesn't drink alcohol but has been using a lot of Tylenol for the pain.  He has been taking Acetaminophen almost 4000 mg daily for the last 4 weeks.      EMG showed right carpal tunnel and he had surgery Thursday. He stated that he got immediate relief from generalized body pain and arm pain that lasted until  morning. Now all the pain is back.  Both hands hurt a lot and right arm is 'weak' 'because of pain' he states.      He saw rheumatologist last 3 weeks ago.  His Rheumatologist advised him to see PCP.  He did the complete rheumatology assessment, including the EMG. The rheumatology tests were all normal. He is wondering if he has a pinched nerve somewhere? Or if he has fibromyalgia?      He will call the surgeon to discuss his symptoms postoperatively. He is asking PCP the followin.) what his PCP thinks the next steps would be?    2.)  Due to increased liver enzymes, what pain medicine should he take?  Ibuprofen was not adequate on it's own.      *Ok to leave a detailed message upon call back    Eleni Pascal RN, [facility] Refill/Triage, 6/10/2019 10:02 AM      "

## 2021-05-29 NOTE — TELEPHONE ENCOUNTER
Patient Returning Call  Reason for call:  Returning phone call.  Information relayed to patient: Below message relayed to patient.  Patient has additional questions:  No  If YES, what are your questions/concerns:  No additional questions at this time.  Okay to leave a detailed message?: No call back needed

## 2021-05-29 NOTE — TELEPHONE ENCOUNTER
New Appointment Needed  What is the reason for the visit:    Pre-Op Appt Request  When is the surgery? :  06/06/2019  Where is the surgery?:   [address] [place] [address] [facility]  Who is the surgeon? :  Dr. Valero  What type of surgery is being done?: Right carpal tunnel release  Provider Preference: Any available  How soon do you need to be seen?: Before 06/06/2019  Waitlist offered?: No  Okay to leave a detailed message:  Yes

## 2021-05-29 NOTE — TELEPHONE ENCOUNTER
Left message to call back for: Pt to call back back on mainline to schedule a Pre-op Physical   Information to relay to patient:  LVM for Pt to call back.  Provider is okay to use reserved slots on Monday for Pt to be seen.  See message below.

## 2021-05-29 NOTE — PATIENT INSTRUCTIONS - HE
Follow your surgeon's direction on when to stop eating and drinking prior to surgery.  Your surgeon will be managing your pain after your surgery.    Remove all jewelry and metal piercings before your surgery.

## 2021-05-29 NOTE — PROGRESS NOTES
Preoperative Exam    Scheduled Procedure: RT carpal tunnel  Surgery Date:  06/06/19  Surgery Location: [address] [place] [address] in [place]  [phone]   Surgeon:  Dr. Valero    Assessment/Plan:     1. Encounter for preoperative examination for general surgical procedure  He is cleared to proceed with the right carpal tunnel release surgery as scheduled on 6/6/2019.    2. Carpal tunnel syndrome of right wrist    3. Other hyperlipidemia  Continue atorvastatin 40 mg daily.  We are going to check his cholesterol and a CMP today.  - Comprehensive Metabolic Panel  - Lipid Cascade     Surgical Procedure Risk: Low (reported cardiac risk generally < 1%)  Have you had prior anesthesia?: Yes  Have you or any family members had a previous anesthesia reaction:  No  Do you or any family members have a history of a clotting or bleeding disorder?: No  Cardiac Risk Assessment: no increased risk for major cardiac complications    Patient approved for surgery with general or local anesthesia.        Functional Status: Independent  Patient plans to recover at home with family.     Subjective:      Karri Brady is a 68 y.o. male who presents for a preoperative consultation.  He has been struggling with worsening pain in the hands, right greater than left over the past few months.  He has also been developing numbness and tingling symptoms as well.  He was seen by neurology and had an EMG which showed nerve impingement consistent with carpal tunnel syndrome.  He is scheduled to have this surgically treated on 6/6/2019.  [facility] also [facility] [place].  [facility] and he is not sure when his last cholesterol panel was done.    All other systems reviewed and are negative, other than those listed in the HPI.    Pertinent History  Do you have difficulty breathing or chest pain after walking up a flight of stairs: No  History of obstructive  sleep apnea: No  Steroid use in the last 6 months: Yes: steroid injectio-back area in January.  Frequent Aspirin/NSAID use: Yes: To help with various muscle aches and pains  Prior Blood Transfusion: No  Prior Blood Transfusion Reaction: No  If for some reason prior to, during or after the procedure, if it is medically indicated, would you be willing to have a blood transfusion?:  There is no transfusion refusal.    Current Outpatient Medications   Medication Sig Dispense Refill     acetaminophen (TYLENOL) 325 MG tablet TAKE TWO TABLETS BY MOUTH EVERY 6 HOURS AS NEEDED FOR PAIN OR FEVER. *NOT TO EXCEED 4000MG IN 24 HOURS*       atorvastatin (LIPITOR) 40 MG tablet Take 40 mg by mouth daily.       No current facility-administered medications for this visit.         Allergies   Allergen Reactions     Venom-Honey Bee        Patient Active Problem List   Diagnosis     Bee Sting     Localized Osteoarthritis Of Multiple Sites     Hyperlipidemia     Impaired Fasting Glucose     Osteoarthritis Of The Hip     Foot Injury     Cerumen Impaction     Tinea Cruris     Itching (Pruritus)     Joint Pain, Localized In The Shoulder     Tingling (Paresthesia)     Dermatitis     Degenerative arthritis of thumb, right       No past medical history on file.    Past Surgical History:   Procedure Laterality Date     [place] REPAIR ACHILLES TENDON,PRIMARY      Description: Primary Repair Of Ruptured Achilles Tendon;  Recorded: 04/16/2014;       Social History     Socioeconomic History     Marital status:      Spouse name: Not on file     Number of children: Not on file     Years of education: Not on file     Highest education level: Not on file   Occupational History     Not on file   Social Needs     Financial resource strain: Not on file     Food insecurity:     Worry: Not on file     Inability: Not on file     Transportation needs:     Medical: Not on file     Non-medical: Not on file   Tobacco Use     Smoking status: Never Smoker      "Smokeless tobacco: Never Used   Substance and Sexual Activity     Alcohol use: Yes     Comment: rare     Drug use: No     Sexual activity: Not on file   Lifestyle     Physical activity:     Days per week: Not on file     Minutes per session: Not on file     Stress: Not on file   Relationships     Social connections:     Talks on phone: Not on file     Gets together: Not on file     Attends Orthodox service: Not on file     Active member of club or organization: Not on file     Attends meetings of clubs or organizations: Not on file     Relationship status: Not on file     Intimate partner violence:     Fear of current or ex partner: Not on file     Emotionally abused: Not on file     Physically abused: Not on file     Forced sexual activity: Not on file   Other Topics Concern     Not on file   Social History Narrative     Not on file             Objective:     Vitals:    06/03/19 1002   BP: 120/70   Pulse: (!) 56   Resp: 16   Temp: 98.6  F (37  C)   TempSrc: Oral   Weight: 174 lb 3 oz (79 kg)   Height: 5' 5.5\" (1.664 m)         Physical Exam:  General Appearance: Alert, cooperative, no distress, appears stated age  Head: Normocephalic, without obvious abnormality, atraumatic  Eyes: PERRL, conjunctiva/corneas clear, EOM's intact  Ears: Normal TM's and external ear canals, both ears  Nose: Nares normal, septum midline,mucosa normal, no drainage  Throat: Lips, mucosa, and tongue normal; teeth and gums normal  Neck: Supple, symmetrical, trachea midline, no adenopathy;  thyroid: not enlarged, symmetric, no tenderness/mass/nodules  Back: Symmetric, no curvature, ROM normal, no CVA tenderness  Lungs: Clear to auscultation bilaterally, respirations unlabored  Heart: Regular rate and rhythm, S1 and S2 normal, no murmur, rub, or gallop,  Abdomen: Soft, non-tender, bowel sounds active all four quadrants,  no masses, no organomegaly   Extremities: Extremities normal, atraumatic, no cyanosis or edema.  Hands are tender to " palpation.  Tinel's and Phalen's testing positive bilaterally radial and ulnar pulses 2 out of 4 bilaterally.  Skin: Skin color, texture, turgor normal, no rashes or lesions  Lymph nodes: Cervical, supraclavicular, and axillary nodes normal  Neurologic: He is alert.  Normal speech.  No focal deficits.  Normal deep tendon reflexes.   Psychiatric: He has a normal mood and affect.       Patient Instructions     Follow your surgeon's direction on when to stop eating and drinking prior to surgery.  Your surgeon will be managing your pain after your surgery.    Remove all jewelry and metal piercings before your surgery.           Labs:  Labs pending at this time.  Results will be reviewed when available.    Immunization History   Administered Date(s) Administered     Influenza, inj, historic,unspecified 01/01/1900     Tdap 08/01/2012     ZOSTER, LIVE 08/27/2014           Electronically signed by Dom Stone DO 06/03/19 10:04 AM

## 2021-05-29 NOTE — TELEPHONE ENCOUNTER
Left message to call back for: Karri  Information to relay to patient:  Please notify patient of Dr. Villasenor's message.

## 2021-05-29 NOTE — TELEPHONE ENCOUNTER
Please contact this patient and help him schedule an appointment with me for the preoperative exam.  I am fine with him using 1 of the reserves spots.  I see that there is one available on Monday morning.  Thank you, DE

## 2021-05-29 NOTE — TELEPHONE ENCOUNTER
I agree with his plan to follow-up with the hand surgeon about his bilateral hand and arm pain symptoms that are continuing despite his recent surgery.  I am not able to see in the chart whether or not he has had an MRI of the cervical spine.  I see that he has had an MRI of the lumbar spine, but I am not able to see the results.  His liver enzymes are most likely elevated from all of the Tylenol he has been taking.  He can switch to ibuprofen.  He could increase the dose to 600 mg up to 4 times daily as long as he takes it with food.  We will need to keep a close eye on his kidney function though.  The diagnosis of fibromyalgia was often made by rheumatology.  It looks like he had a full work-up by them that was unremarkable.  I recommend he start with following up with the hand specialist and go from there.  He may need to see a spine specialist as well if symptoms continue.  Thank you, DE

## 2021-05-30 NOTE — PROGRESS NOTES
Ultrasound results are normal, please call results to the patient or send a letter if not reachable by phone.

## 2021-05-30 NOTE — TELEPHONE ENCOUNTER
"Pt states \"I may have a broken rib.\"  R side.  Pain onset approx one week ago.  Correlates to physical therapy exercises for neck pain -> \"did lots of twisting and turning, left and right.\"  No breathing symptoms.  Pt states \"I seem to be falling apart in terms of new pains and new diagnoses of osteoarthritis in multiple places in my body.\"    Pt agrees to schedule clinical eval per triage disposition.  Warm transferred to a  for this purpose now.    Marlene Bueno RN BSBA  [facility] RN Triage     Reason for Disposition    [1] High-risk adult (e.g., age > 60, osteoporosis, chronic steroid use) AND [2] still hurts    Protocols used: CHEST INJURY - BENDING, LIFTING, OR [id number]      "

## 2021-05-30 NOTE — PROGRESS NOTES
HPI:  Karri Brady is a 68 y.o. male who is seen for   Chief Complaint   Patient presents with     Possible rib injury     x 1 wk to 10 day, right side rib to the back, Broken rib in september,    Karri Brady is seen after 10 days of new right rib pain.  He had a fracture on his right side in September of last year.  He now describes pain as lasting in the last 10 days, very tender over the lateral seventh rib with radiation to the back.  [facility]  [facility].  He is currently on gabapentin.  He had a recent EMG, for upper extremities, had carpal tunnel syndrome surgery on 6/6/2019 on the right side.  He has meloxicam which she takes occasionally.  He also has a muscle relaxer which he takes occasionally.  He states the muscle relaxer seems to help for the rib pain.  Patient denies chest pain, palpitations, shortness of breath, wheezing, cough, neck pain, back pain, dysuria, flank pain, abdominal pain, nausea, vomiting, diarrhea, constipation, black or bloody stools, acid reflux, lower leg edema, claudication, muscle weakness, dizziness, headaches, change in vision, changes in hearing, tinnitus, nasal congestion, fever, weight loss, globus, dysphagia, increased urination, increased thirst, depression, anxiety.  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, wasps and bees     Localized Osteoarthritis Of Multiple Sites     Other hyperlipidemia     Osteoarthritis Of The Hip     Joint Pain, Localized In The Shoulder     Degenerative arthritis of thumb, right     Family History   Problem Relation Age of Onset     Rheum arthritis Mother      Rheumatic fever Mother      Stroke Father      Heart disease Father      Social History     Socioeconomic History     Marital status:       Spouse name: None     Number of children: None     Years of education: None     Highest education level: None   Occupational History     None   Social Needs     Financial resource strain: None     Food insecurity:     Worry: None     Inability: None     Transportation needs:     Medical: None     Non-medical: None   Tobacco Use     Smoking status: Never Smoker     Smokeless tobacco: Never Used   Substance and Sexual Activity     Alcohol use: Yes     Comment: rare     Drug use: No     Sexual activity: None   Lifestyle     Physical activity:     Days per week: None     Minutes per session: None     Stress: None   Relationships     Social connections:     Talks on phone: None     Gets together: None     Attends Mu-ism service: None     Active member of club or organization: None     Attends meetings of clubs or organizations: None     Relationship status: None     Intimate partner violence:     Fear of current or ex partner: None     Emotionally abused: None     Physically abused: None     Forced sexual activity: None   Other Topics Concern     None   Social History Narrative     None     Past Surgical History:   Procedure Laterality Date     [place] REPAIR ACHILLES TENDON,PRIMARY      Description: Primary Repair Of Ruptured Achilles Tendon;  Recorded: 04/16/2014;     Current Outpatient Medications on File Prior to Visit   Medication Sig Dispense Refill     acetaminophen (TYLENOL) 325 MG tablet TAKE TWO TABLETS BY MOUTH EVERY 6 HOURS AS NEEDED FOR PAIN OR FEVER. *NOT TO EXCEED 4000MG IN 24 HOURS*       atorvastatin (LIPITOR) 40 MG tablet Take 40 mg by mouth daily.       No current facility-administered medications on file prior to visit.      Allergies   Allergen Reactions     Venom-Honey Bee        I have reviewed the patient's medical history in detail and updated the computerized patient record.  OBJECTIVE:  Wt Readings from Last 3 Encounters:   07/12/19 178 lb 1.6 oz (80.8 kg)   06/03/19 174 lb 3 oz (79  kg)   05/02/16 170 lb 6 oz (77.3 kg)     Temp Readings from Last 3 Encounters:   06/03/19 98.6  F (37  C) (Oral)   05/02/16 98.1  F (36.7  C) (Oral)   12/14/15 98.4  F (36.9  C) (Oral)     BP Readings from Last 3 Encounters:   07/12/19 132/82   06/03/19 120/70   05/02/16 112/66     Pulse Readings from Last 3 Encounters:   07/12/19 60   06/03/19 (!) 56   05/02/16 60     Body mass index is 28.75 kg/m .     Alert, cooperative, well-hydrated. Appears well.  Eyes: Pupils equal, round, reactive to light.  HEENT: Sclera white, nares patent, MMM, TM's pearly bilaterally  Neck: supple, without lymphadenopathy, Thyroid freely movable and without hypotrophy or nodularity.   Lungs: Clear to auscultation. No retractions, no increased work of respiration, equal chest rise.   Heart: Regular rate and rhythm, no murmurs, clicks,   Gallops.  Abdomen: Soft, bowel sounds in 4 quadrants with no tenderness to palpation, no organomegaly or masses, no aortic or renal bruits.  Extremities: no tenderness to palpation of gastrocnemius, bilaterally.  Skin: no increased warmth, edema, or erythema of lower legs bilaterally.  Back: No cervical, thoracic or lumbar tenderness to spinous processes or musculature.  Neuro::pupils equal and reactive to light bilaterally, CN II - XII grossly intact. No focal motor/sensory deficits. DTR 2/4 all 4 extremities. Muscle Strength 5/5 all extremities, Rhomberg negative  Labs:  Physical on 06/03/2019   Component Date Value     Sodium 06/03/2019 139      Potassium 06/03/2019 4.3      Chloride 06/03/2019 104      CO2 06/03/2019 24      Anion Gap, Calculation 06/03/2019 11      Glucose 06/03/2019 88      BUN 06/03/2019 14      Creatinine 06/03/2019 0.82      GFR MDRD Af Amer 06/03/2019 >60      GFR MDRD Non Af Amer 06/03/2019 >60      Bilirubin, Total 06/03/2019 0.5      Calcium 06/03/2019 9.7      Protein, Total 06/03/2019 7.1      Albumin 06/03/2019 4.3      Alkaline Phosphatase 06/03/2019 66      AST 06/03/2019  41*     ALT 06/03/2019 63*     Cholesterol 06/03/2019 156      Triglycerides 06/03/2019 60      HDL Cholesterol 06/03/2019 44      LDL Calculated 06/03/2019 100      Patient Fasting > 8hrs? 06/03/2019 Yes      ASSESMENT/PLAN:  1. Rib pain on right side  XR Ribs Right W PA Chest    XR Ribs Right W PA Chest    CANCELED: US Abdomen Limited   2. Elevated liver enzymes  US Abdomen Limited    US Abdomen Limited   Discussed the possibility of arthritis in this recently injured but not very recently injured rib.  Discussed new fracture, if there is a new fracture with minimal activity would be concerned about osteoporosis.  Would consider follow-up with his primary care for this testing if there is a new fracture.  We will call him with results of this testing.  Advised to continue deep breaths, with naproxen, Tylenol, or ibuprofen as needed for pain.  Continue all regular medications as prescribed.  We will do ultrasound of the liver to verify that there is not a gallbladder concern or new liver disease.  Follow-up with Dr. Altamirano and 2 weeks.  Malia Hernandez, MS, PA-C 07/15/19

## 2021-06-02 VITALS — HEIGHT: 66 IN | BODY MASS INDEX: 27.99 KG/M2 | WEIGHT: 174.19 LBS

## 2021-06-03 VITALS — HEIGHT: 66 IN | WEIGHT: 178.1 LBS | BODY MASS INDEX: 28.62 KG/M2

## 2021-06-04 VITALS
TEMPERATURE: 97.8 F | HEART RATE: 54 BPM | WEIGHT: 175.1 LBS | OXYGEN SATURATION: 97 % | HEIGHT: 65 IN | BODY MASS INDEX: 29.17 KG/M2 | SYSTOLIC BLOOD PRESSURE: 140 MMHG | DIASTOLIC BLOOD PRESSURE: 80 MMHG

## 2021-06-07 NOTE — TELEPHONE ENCOUNTER
Pt notified & provided [facility] endo phone# in case he doesn't hear from them in the next week or 2

## 2021-06-07 NOTE — TELEPHONE ENCOUNTER
Referral Request  Type of referral:   1. Endocrinologist     Who s requesting: Patient     Why the request: Patient states he has nodules on his thyroid and would like a 2nd opinion as 1 is growing .    Have you been seen for this request: N/A -  No documentation of this matter w/ PCP    Does patient have a preference on a group/provider?   [facility] - ENDOCRINOLOGY     Okay to leave a detailed message?  Yes

## 2021-06-07 NOTE — TELEPHONE ENCOUNTER
Left message to call back for: Karri  Information to relay to patient:  Patient has appointment for PreOP on 04/08/2020 with DE.  Is surgery still scheduled? If yes please help patient reschedule PreOP with another provider and location. Maybe he can see Dr. Canales at the [facility].  Thank you.

## 2021-06-07 NOTE — TELEPHONE ENCOUNTER
Medication Request  Medication name: Ranitidine   Requested Pharmacy: [facility] # [id number]  Reason for request: patient is requesting for gastric acid reflux   When did you use medication last?:  Unknown   Patient offered appointment:  N/A - electronic request  Okay to leave a detailed message: no

## 2021-06-07 NOTE — TELEPHONE ENCOUNTER
Authorizing: Dom Hairston, DO in [facility]             Diagnosis: Thyroid nodule [E04.1]     Please review chart and advise on if okay to schedule with [facility] or if patient should see [facility] with [facility] or [facility].     If with [facility] how soon? Okay for Video/phone visit?

## 2021-06-07 NOTE — PROGRESS NOTES
Preoperative Exam    Scheduled Procedure: 05/11/2020, trigger finger release on left hand at [facility] by Dr. Bernardo Valero  Surgery Date:  5/11/20  Surgery Location: [facility] - Fax: [phone]    Surgeon:  Dr. Bernardo Valero    Assessment/Plan:     1. Preop general physical exam    The patient is approved for the indicated surgery, with the proper anesthesia.  Patient should have no problems with this and I would expect a normal recovery.  Follow-up in their primary care clinic with any primary care needs after the surgery.  The necessary labs and tests were done today and reviewed by me.     - Electrocardiogram Perform and Read  - Hemoglobin  - Basic Metabolic Panel    2. Trigger finger, acquired      3. Peripheral polyneuropathy          Surgical Procedure Risk: Low (reported cardiac risk generally < 1%)  Have you had prior anesthesia?: Yes  Have you or any family members had a previous anesthesia reaction:  No  Do you or any family members have a history of a clotting or bleeding disorder?: No  Cardiac Risk Assessment: no increased risk for major cardiac complications    APPROVAL GIVEN to proceed with proposed procedure, without further diagnostic evaluation        Functional Status: Independent  Patient plans to recover at home alone.     Subjective:      Karri Brady is a 69 y.o. male who presents for a preoperative consultation.  Patient is having a trigger finger done, actually having 2 done.  He has had several done already so this seems to be a chronic problem for him.  He is having this done next week and they plan on doing COVID testing on him in a couple of days to verify that status.  He generally has no questions getting the surgery and that only been planned to put him to sleep.    All other systems reviewed and are negative, other than those listed in the HPI.    Pertinent History  Do you have difficulty breathing or chest pain after walking up a flight of stairs: No  History of obstructive sleep  apnea: No  Steroid use in the last 6 months: Yes: steroid injection in hand in Feb.   Frequent Aspirin/NSAID use: No  Prior Blood Transfusion: No  Prior Blood Transfusion Reaction: No  If for some reason prior to, during or after the procedure, if it is medically indicated, would you be willing to have a blood transfusion?:  There is no transfusion refusal.    Current Outpatient Medications   Medication Sig Dispense Refill     atorvastatin (LIPITOR) 40 MG tablet Take 40 mg by mouth daily.       gabapentin (NEURONTIN) 300 MG capsule TAKE 2 CAPSULES BY MOUTH TWICE A DAY  AND TAKE 3 CAPSULES BY MOUTH AT BEDTIME       meloxicam (MOBIC) 7.5 MG tablet 7.5 mg.       ranitidine (ZANTAC) 150 MG capsule Take 1 capsule (150 mg total) by mouth 2 (two) times a day as needed for heartburn. 180 capsule 3     No current facility-administered medications for this visit.         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    Past Surgical History:   Procedure Laterality Date     PA REPAIR ACHILLES TENDON,PRIMARY      Description: Primary Repair Of Ruptured Achilles Tendon;  Recorded: 04/16/2014;       Social History     Socioeconomic History     Marital status:      Spouse name: Not on file     Number of children: Not on file     Years of education: Not on file     Highest education level: Not on file   Occupational History     Not on file   Social Needs     Financial resource strain: Not on file     Food insecurity     Worry: Not on file     Inability: Not on file     Transportation needs     Medical: Not on file     Non-medical: Not  "on file   Tobacco Use     Smoking status: Never Smoker     Smokeless tobacco: Never Used   Substance and Sexual Activity     Alcohol use: Yes     Comment: rare     Drug use: No     Sexual activity: Not on file   Lifestyle     Physical activity     Days per week: Not on file     Minutes per session: Not on file     Stress: Not on file   Relationships     Social connections     Talks on phone: Not on file     Gets together: Not on file     Attends [employer]ism service: Not on file     Active member of club or organization: Not on file     Attends meetings of clubs or organizations: Not on file     Relationship status: Not on file     Intimate partner violence     Fear of current or ex partner: Not on file     Emotionally abused: Not on file     Physically abused: Not on file     Forced sexual activity: Not on file   Other Topics Concern     Not on file   Social History Narrative     Not on file       Patient Care Team:  Dom Hairston DO as PCP - General (Family Medicine)  Dom Hairston DO as Assigned PCP          Objective:     Vitals:    05/04/20 1427   BP: 140/80   Pulse: (!) 54   Temp: 97.8  F (36.6  C)   SpO2: 97%   Weight: 175 lb 1.6 oz (79.4 kg)   Height: 5' 5\" (1.651 m)         Physical Exam:    General: Awake, Alert and Cooperative   Head: Normocephalic and Atraumatic   Eyes: PERRL, EOMI.   ENT: Normal pearly TMs bilaterally and Oropharynx clear   Neck: Supple and Thyroid without enlargement or nodules   Chest: Chest wall normal   Lungs: Clear to auscultation bilaterally   Heart:: Regular rate and rhythm and no murmurs.  No significant LE edema.   Abdomen: Soft, nontender, nondistended and no hepatosplenomegaly   Musculoskeletal: Moving all extremities and No pain in the extremities   Neuro: Alert and oriented times 3 and Grossly normal   Skin: No rashes or lesions noted        There are no Patient Instructions on file for this visit.    EKG:  Normal sinus rhythm with no hyperacute " or acute changes    Labs:  Labs pending at this time.  Results will be reviewed when available.    Immunization History   Administered Date(s) Administered     Influenza high dose,seasonal,PF, 65+ yrs 12/05/2017     Influenza, Seasonal, Inj PF IIV3 09/27/2018     Pneumo Conj 13-V (2010&after) 09/16/2016     Pneumo Polysac 23-V 10/06/2017     Tdap 08/01/2012     ZOSTER, LIVE 08/22/2013, 08/27/2014     ZOSTER, RECOMBINANT, IM 04/11/2018, 02/13/2019, 05/02/2019           Electronically signed by Karri Shahid MD 05/04/20 2:35 PM

## 2021-06-07 NOTE — TELEPHONE ENCOUNTER
Appointment was for a trigger finger surgery, patient will call back when surgery is scheduled again, cancelled for now.    Vicky Merrill, CMA

## 2021-06-12 NOTE — TELEPHONE ENCOUNTER
Please contact this patient and let him know that the paperwork has been filled out and is ready to be picked up, faxed or mailed.  Please ask him how he would like to receive it.  Thank you, DE

## 2021-06-12 NOTE — TELEPHONE ENCOUNTER
Pt came into [facility] today to drop off form for DMV. Please complete form. When done, please call pt and he will come to .   Pt states he marked number 9 in the physicians section because of his spine sometimes pinching a nerve causing his left leg to be immobile. Pt says to call him with any questions.

## 2021-06-12 NOTE — TELEPHONE ENCOUNTER
Patient Returning Call  Reason for call:  Returning call  Information relayed to patient:    Relayed below message to patient.  Patient has additional questions:  Yes  If YES, what are your questions/concerns:    Patient states he will  paperwork today, 10/21/2020, at the clinic .  Okay to leave a detailed message?: Yes

## 2021-06-12 NOTE — TELEPHONE ENCOUNTER
Who is calling:  Patient   Reason for Call:  Patient is calling in to check  if clinic received his medical records from [facility] ? And also patient is going to drop off Disability form for parking permit at clinic today .  Date of last appointment with primary care: 05/04/20  Okay to leave a detailed message: No

## 2021-06-16 NOTE — TELEPHONE ENCOUNTER
Telephone Encounter by Neema Núñez LPN at 3/30/2020  3:10 PM     Author: Neema Núñez LPN Service: -- Author Type: Licensed Nurse    Filed: 3/30/2020  3:14 PM Encounter Date: 3/30/2020 Status: Signed    : Neema Núñez LPN (Licensed Nurse)       Patient Returning Call  Reason for call:  Patient returning call   Information relayed to patient:  Padma Belcher CMA           3/30/20 2:45 PM   Note      Left message to call back for: Karri  Information to relay to patient:  Patient has appointment for PreOP on 04/08/2020 with DE.  Is surgery still scheduled? If yes please help patient reschedule PreOP with another provider and location. Maybe he can see Dr. Canales at the [facility].  Thank you.      Patient has additional questions:  Yes  If YES, what are your questions/concerns:  Patient is questioning why Dr. Altamirano do his Pre-ope visit if he is not aviable on 4/08/20 patient would like to switch to another day in that week if not then patient will schedule with Dr. Canales at the [facility].  Okay to leave a detailed message?: No

## 2021-06-19 NOTE — LETTER
Letter by Malia Hernandez PA-C at      Author: Malia Hernandez PA-C Service: -- Author Type: --    Filed:  Encounter Date: 7/16/2019 Status: (Other)         Karri Brady  [address] [place]  [facility] [place] [address]             July 16, 2019         Dear Mr. Brady,    Below are the results from your recent visit:    Resulted Orders   US Abdomen Limited    Narrative    EXAM DATE:         07/16/2019    EXAM: US ABDOMEN LIMITED  LOCATION: [facility]  DATE/TIME: 7/16/2019 12:45 PM    INDICATION: elevated liver enzymes  COMPARISON: None.    FINDINGS:  GALLBLADDER: Normal, without cholelithiasis.  BILE DUCTS: No bile duct dilation. Common duct measures 3 mm.  LIVER: Slight increased echogenicity. There is a tiny subcapsular cyst in the  left lobe.  RIGHT KIDNEY: No hydronephrosis.  PANCREAS: Visualized pancreas is normal.    No ascites in the right upper quadrant.    CONCLUSION:  1.  Patient has mild hepatic steatosis. Incidental tiny liver cyst.  2.  No evidence of cholelithiasis or cholecystitis.                  Ultrasound results are normal.    Please call with questions or contact us using [employer].    Sincerely,        Electronically signed by Malia Hernandez PA-C

## 2021-06-24 ENCOUNTER — OFFICE VISIT - HEALTHEAST (OUTPATIENT)
Dept: FAMILY MEDICINE | Facility: CLINIC | Age: 71
End: 2021-06-24

## 2021-06-24 ENCOUNTER — COMMUNICATION - HEALTHEAST (OUTPATIENT)
Dept: SCHEDULING | Facility: CLINIC | Age: 71
End: 2021-06-24

## 2021-06-24 DIAGNOSIS — G89.29 OTHER CHRONIC PAIN: ICD-10-CM

## 2021-06-24 DIAGNOSIS — M25.561 PAIN IN RIGHT KNEE: ICD-10-CM

## 2021-06-24 DIAGNOSIS — M25.562 PAIN IN LEFT KNEE: ICD-10-CM

## 2021-06-24 DIAGNOSIS — M25.561 CHRONIC PAIN OF BOTH KNEES: ICD-10-CM

## 2021-06-24 DIAGNOSIS — M25.562 CHRONIC PAIN OF BOTH KNEES: ICD-10-CM

## 2021-06-24 DIAGNOSIS — G89.29 CHRONIC PAIN OF BOTH KNEES: ICD-10-CM

## 2021-06-24 NOTE — TELEPHONE ENCOUNTER
DIAGNOSIS: B knee pain , per pt , no recent images    APPOINTMENT DATE: 7.1.21   NOTES STATUS DETAILS   OFFICE NOTE from referring provider N/A    OFFICE NOTE from other specialist N/A    DISCHARGE SUMMARY from hospital N/A    DISCHARGE REPORT from the ER N/A    OPERATIVE REPORT N/A    EMG report N/A    MEDICATION LIST Internal    MRI In process    DEXA (osteoporosis/bone health) N/A    CT SCAN N/A    XRAYS (IMAGES & REPORTS) N/A      Action 6.24.21 AM DANIELE   Action Taken Called [facility] [phone] and left message. Faxed request to [phone]     Action 6.30.21 8:34 AM DANIELE   Action Taken Faxed urgent request to [facility] [phone]

## 2021-06-25 ENCOUNTER — COMMUNICATION - HEALTHEAST (OUTPATIENT)
Dept: INTERNAL MEDICINE | Facility: CLINIC | Age: 71
End: 2021-06-25

## 2021-06-26 NOTE — TELEPHONE ENCOUNTER
Pt called in ask x ray result from today's visit.  Inform the caller the provider will call after the review of the xray result.  Care advice given per protocol.  Patient agrees with care advice given.   Agreed to call back if he has additional symptoms or questions.      Neeraj Sparks RN, [facility] Triage/Med Refill 6/24/2021 5:04 PM

## 2021-06-26 NOTE — TELEPHONE ENCOUNTER
Dr. Soto    Patient is calling back and he would like to go over his xray results with someone.  He is wondering why he is still having pain.  He would like a call back at .

## 2021-06-26 NOTE — PATIENT INSTRUCTIONS - HE
I would suspect that your pain is most likely related to arthritis.  We will check xrays today and will let you know the results later today once the radiologist has read them.     I would recommend that you back off on the leg presses and consider adding in leg extension exercises to your regimen.      I would recommend the following providers here at the [facility], if you would like to establish care with a new primary care provider:     -Olga Mas DO (family medicine)     -AMBERLY Laguna (internal medicine)

## 2021-07-01 ENCOUNTER — PRE VISIT (OUTPATIENT)
Dept: ORTHOPEDICS | Facility: CLINIC | Age: 71
End: 2021-07-01

## 2021-07-03 NOTE — ADDENDUM NOTE
Addendum Note by Dom Gutierrez DO at 7/17/2019 10:00 AM     Author: Dom Gutierrez DO Service: -- Author Type: Physician    Filed: 7/17/2019 10:03 AM Encounter Date: 7/17/2019 Status: Signed    : Dom Gutierrez DO (Physician)    Addended by: DOM GUTIERREZ on: 7/17/2019 10:03 AM        Modules accepted: Orders

## 2021-07-04 NOTE — LETTER
Letter by Dom Hairston DO at      Author: Dom Hairston DO Service: -- Author Type: --    Filed:  Encounter Date: 6/25/2021 Status: (Other)         Karri Brady  [address] [place] [address]      June 25, 2021      Dear Mr. Brady,    Enclosed are exercises that could be performed at home to try to bring about improvement in your left knee pain.  Please let me know if things are not improving within the next 4-6 weeks, as at that point, you may want to consider imaging and/or physical theapy.     Sincerely,        Boni Soto MD

## 2021-07-06 VITALS — BODY MASS INDEX: 29.6 KG/M2 | SYSTOLIC BLOOD PRESSURE: 128 MMHG | WEIGHT: 177.9 LBS | DIASTOLIC BLOOD PRESSURE: 62 MMHG

## 2021-07-07 NOTE — TELEPHONE ENCOUNTER
Please apologize for the delayed results.   The xray showed mild arthritic changes present at the right knee, but no abnormalities at all on the left side.    Given this result, I would have to believe that the left sided medial knee pain is more likely related to a mild meniscus injury, based on location of pain.  Given yesterday's fairly normal exam, I would not think that it would likely require anything more aggressive than exercises and modification of activities, as we had discussed at the visit yesterday.  I will mail him home exercises to consider adding in but if it isn't responding to these measures within the next 4 weeks, I would recommend that he let me know so that we could reconsider referral to formal PT.   Thanks,  Boni Soto MD

## 2021-07-07 NOTE — TELEPHONE ENCOUNTER
Called patient and left message to call back for results of xray. When he calls back please relay results below from Dr. Soto.

## 2021-07-13 NOTE — PROGRESS NOTES
Progress Notes by Boni Soto MD at 6/24/2021  9:20 AM     Author: Boni Soto MD Service: -- Author Type: Physician    Filed: 7/13/2021  1:53 AM Encounter Date: 6/24/2021 Status: Signed    : Boni Soto MD (Physician)       Assessment:     1. Chronic pain of both knees (left > right)  XR KNEE BILATERAL AP STANDING W SUNRISE    XR KNEE BILATERAL AP STANDING W SUNRISE      Plan:     Xray of the right knee shows evidence of DJD, left was read as normal - which is somewhat surprising given crepitus noted on exam and patient's current symptoms.  It is possible that he suffered an acute meniscus injury but without any locking or instability noted, I would hesitate to advise further imaging with an MRI without first pursuing a course of physical therapy.  Patient is advised initially to pursue home exercises to help strengthen quadriceps muscles to take some of the stress off of the knee joints. If symptoms are not starting to improve with these exercises within 4 weeks, he is to let me know so that we can reconsider referral to see psychiatry.      Patient Instructions   I would suspect that your pain is most likely related to arthritis.  We will check xrays today and will let you know the results later today once the radiologist has read them.     I would recommend that you back off on the leg presses and consider adding in leg extension exercises to your regimen.      I would recommend the following providers here at the [facility], if you would like to establish care with a new primary care provider:     -Olga Mas DO (family medicine)     -AMBERLY Laguna (internal medicine)      Return in about 4 weeks (around 7/22/2021) for Follow up.     28 minutes spent on the date of the encounter doing chart review, history and exam, documentation and further activities per the note.      Subjective:      Karri Brady is a 70 y.o. male who presents with knee pain involving the left  knee. Onset was 3 weeks ago.  Inciting event: patient thinks that it may be impacted by increased activity (walking/ jogging/tennis ) and participating in PNB spine strengthening program. Current symptoms include: crepitus sensation and pain located medial joint lines, left > right. Pain is aggravated by any weight bearing, lateral movements, rising after sitting, running, squatting, standing and walking. Patient has had no prior knee problems. Evaluation to date: none. Treatment to date: OTC analgesics which are somewhat effective.  Patient concerned about possible arthritis as he has previously been noted to have arthritis at the hips and shoulders.       Patient Active Problem List   Diagnosis   ? 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 Localized Osteoarthritis Of Multiple Sites   ? Other hyperlipidemia   ? Osteoarthritis Of The Hip   ? Joint Pain, Localized In The Shoulder   ? Degenerative arthritis of thumb, right   ? Food allergy   ? Trigger finger, acquired   ? Peripheral polyneuropathy     Past Surgical History:   Procedure Laterality Date   ? JOINT REPLACEMENT Bilateral    ? CT REPAIR ACHILLES TENDON,PRIMARY      Description: Primary Repair Of Ruptured Achilles Tendon;  Recorded: 04/16/2014;   ? rotator cuff tear     ? SHOULDER SURGERY     ? TRIGGER FINGER RELEASE         Social History     Tobacco Use   ? Smoking status: Never Smoker   ? Smokeless tobacco: Never Used   Substance Use Topics   ? Alcohol use: Yes     Comment: rare     Family History   Problem Relation Age of Onset   ? Rheum arthritis Mother    ? Rheumatic fever Mother    ? Stroke Father    ? Heart disease Father          Current Outpatient Medications   Medication Sig Dispense Refill   ? atorvastatin (LIPITOR) 40 MG tablet Take 40 mg by mouth daily.     ? calcium carbonate-vitamin D3 (OS-DEVIN 250+ D) 250-125 mg-unit Tab per tablet TAKE ONE TABLET BY MOUTH TWICE A DAY     ? camphor-menthoL (SARNA) lotion APPLY THIN LAYER TOPICALLY EVERY  DAY AS NEEDED FOR ITCHINESS     ? cholecalciferol, vitamin D3, 1,000 unit (25 mcg) tablet TAKE ONE TABLET BY MOUTH EVERY DAY     ? cyanocobalamin 1000 MCG tablet TAKE ONE TABLET BY MOUTH EVERY DAY     ? famotidine (PEPCID) 20 MG tablet Take 20 mg by mouth daily.     ? fluoride, sodium, (DENTAGEL) 1.1 % Gel dental gel BRUSH SMALL AMOUNT MOUTH EVERY MORNING AND AT BEDTIME ON TOOTHBRUSH, BRUSH FOR 2 MINUTES.     ? gabapentin (NEURONTIN) 300 MG capsule Take 600 mg by mouth 3 (three) times a day.     ? HYDROcodone-acetaminophen 5-325 mg per tablet      ? lifitegrast 5 % Dpet INSTILL ONE DROP IN BOTH EYES TWICE A DAY     ? meloxicam (MOBIC) 7.5 MG tablet 7.5 mg daily as needed.     ? terbinafine HCL (LAMISIL) 1 % cream APPLY THIN LAYER TOPICALLY TWICE A DAY     ? valACYclovir (VALTREX) 500 MG tablet TAKE ONE TABLET BY MOUTH TWICE A DAY . TAKE WITH FIRST INDICATION OF OUTBREAK.       No current facility-administered medications for this visit.     Allergies   Allergen Reactions   ? Hymenoptera Allergenic Extract Anaphylaxis   ? Venom-Honey Bee Swelling     Review of Systems  Pertinent items are noted in HPI.     Objective:      /62 (Patient Site: Right Arm, Patient Position: Sitting, Cuff Size: Adult Regular)   Wt 177 lb 14.4 oz (80.7 kg)   BMI 29.60 kg/m     General: alert, cooperative, no apparent distress.   Right knee: positive exam findings: crepitus noted, medial joint line tenderness and negative exam findings: no effusion, no erythema, ACL stable, PCL stable, MCL stable, LCL stable, Simba's negative and FROM   Left knee:  positive exam findings: crepitus and medial joint line tenderness and negative exam findings: no effusion, no erythema, ACL stable, PCL stable, MCL stable, LCL stable, Simba's negative and FROM     EXAM: X-RAY KNEES BILATERAL, 1-2 VIEWS  LOCATION: [facility]  DATE/TIME: 6/24/2021 10:00 AM     INDICATION: Bilateral knee pain.  COMPARISON:  None.     IMPRESSION:  RIGHT KNEE: Normal joint alignment with maintained joint spacing. Minimal osteophytic spurring of the medial femoral condyle and patella. No fracture or bone lesion.     LEFT KNEE: Normal joint alignment and spacing. No fracture or bone lesion.     Boni Soto MD   Family medicine physician  [facility]

## 2021-07-25 ENCOUNTER — HOSPITAL ENCOUNTER (EMERGENCY)
Facility: HOSPITAL | Age: 71
Discharge: HOME | End: 2021-07-25
Attending: EMERGENCY MEDICINE | Admitting: EMERGENCY MEDICINE
Payer: MEDICARE

## 2021-07-25 VITALS
TEMPERATURE: 100 F | HEART RATE: 60 BPM | RESPIRATION RATE: 16 BRPM | SYSTOLIC BLOOD PRESSURE: 147 MMHG | WEIGHT: 175 LBS | OXYGEN SATURATION: 94 % | BODY MASS INDEX: 29.12 KG/M2 | DIASTOLIC BLOOD PRESSURE: 78 MMHG

## 2021-07-25 DIAGNOSIS — L03.119 CELLULITIS AND ABSCESS OF FOOT: ICD-10-CM
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
## 2021-07-25 DIAGNOSIS — L02.619 CELLULITIS AND ABSCESS OF FOOT: ICD-10-CM

## 2021-07-25 PROCEDURE — 99283 EMERGENCY DEPT VISIT LOW MDM: CPT | Mod: 25

## 2021-07-25 PROCEDURE — 10060 I&D ABSCESS SIMPLE/SINGLE: CPT

## 2021-07-25 RX ORDER — DOXYCYCLINE 100 MG/1
100 CAPSULE ORAL 2 TIMES DAILY
Qty: 28 CAPSULE | Refills: 0 | Status: SHIPPED | OUTPATIENT
Start: 2021-07-25 | End: 2021-08-08

## 2021-07-25 RX ORDER — CEPHALEXIN 500 MG/1
500 CAPSULE ORAL 4 TIMES DAILY
Qty: 40 CAPSULE | Refills: 0 | Status: SHIPPED | OUTPATIENT
Start: 2021-07-25 | End: 2021-08-04

## 2021-07-25 ASSESSMENT — ENCOUNTER SYMPTOMS
FEVER: 0
SORE THROAT: 0
DIARRHEA: 0
COLOR CHANGE: 1
DYSURIA: 0
ABDOMINAL PAIN: 0
JOINT SWELLING: 0
NAUSEA: 0
VOMITING: 0
HEMATURIA: 0
CHILLS: 0
SHORTNESS OF BREATH: 0
CONFUSION: 0
DIZZINESS: 0

## 2021-07-25 NOTE — DISCHARGE INSTRUCTIONS
Stop previous bactrim antibiotic and start both doxycycline and keflex and take as directed until gone.  Follow up with primary clinic in 48 hours for re-evaluation.  Use soap/water to clean area. Avoid any other products on area.  Return for substantially increasing swelling/redness, persistent fevers or other concerns.  Antibiotics can take 48-72 hours to start working, so mild changes in that window are ok.  You should be much improved after 7 days.

## 2021-07-25 NOTE — ED TRIAGE NOTES
Patient presents here for concern for redness to his left foot. He had a tick bite initially and felt that he had athletes foot. He does have redness radiating up the dorsal aspect of his foot that he states has gotten bigger. He was seen at urgent care yesterday and began a course of trimethoprim-sulfamethoxazole. He did begin taking the medication yesterday.

## 2021-07-25 NOTE — ED PROVIDER NOTES
Emergency Department Encounter     Evaluation Date & Time:   7/25/2021 10:08 AM    CHIEF COMPLAINT:  Foot Pain      Triage Note:Patient presents here for concern for redness to his left foot. He had a tick bite initially and felt that he had athletes foot. He does have redness radiating up the dorsal aspect of his foot that he states has gotten bigger. He was seen at urgent care yesterday and began a course of trimethoprim-sulfamethoxazole. He did begin taking the medication yesterday.         ED COURSE & MEDICAL DECISION MAKING:        Pt reports finding a tick on the bottom of his left foot recently, removed it, but noticed increased itching/swelling to foot.  Seen yesterday at urgent care and given bactrim, which he has taken 3 doses now.  Pt reports increased redness streaking up foot.  Initially just on foot and to ankle, but extending slightly further.  Pt with fluid filled collections on plantar foot. Will drain.  Given tick bite with unknown how long it was there, will switch to doxycycline for lyme coverage with MRSA treatment, add keflex for strep coverage and have pt stop bactrim.  Pt does have low grade temp, but quite well appearing with no systemic complaints. We had a long discussion regarding expected course with antibx, f/u in 2 days for re-evaluation and strict return precautions.  Pt agreeable.    10:14 AM I met with the patient to collect initial history and perform my initial exam. We discussed the plan of care while in the ED. PPE: [id number].  10:28 AM I performed incision and drainage. Discussed discharge, outpatient follow up, supportive cares, and reasons to return to the ED. Patient is agreeable with this.    At the conclusion of the encounter I discussed the results of all the tests and the disposition. The questions were answered. The patient or family acknowledged understanding and was agreeable with the care plan.      MEDICATIONS GIVEN IN THE EMERGENCY DEPARTMENT:  Medications - No data to  display    NEW PRESCRIPTIONS STARTED AT TODAY'S ED VISIT:  Discharge Medication List as of 7/25/2021 10:58 AM      START taking these medications    Details   cephALEXin (KEFLEX) 500 MG capsule Take 1 capsule (500 mg) by mouth 4 times daily for 10 days, Disp-40 capsule, R-0, E-Prescribe      doxycycline hyclate (VIBRAMYCIN) 100 MG capsule Take 1 capsule (100 mg) by mouth 2 times daily for 14 days, Disp-28 capsule, R-0, E-Prescribe             HPI   HPI     Karri Brady is a 70 year old male with a pertinent history of OA with chronic pain, HLD who presents to this ED by walk in for evaluation of redness and itching to the left foot.    Per chart review, patient evaluated at [facility] 7/24/21 (yesterday). He was prescribed Lotrimin/clortimazole 1% BID for tinea pedis and a 7-day course of Bactrim for cellulitis.    Patient reports he developed itching on his left foot and toes on 7/22/21 (three days ago). He then discovered a wood tick burrowed into this foot. Shortly after, he developed redness and swelling to the foot. He did wash the foot several times with an antibacterial soap. He was seen at the  yesterday morning and given antibiotics, advised to use cream for athlete's foot and bacitracin. He has taken three doses of bactrim since yesterday, two yesterday one today. Since then, redness has spread up his calf. He denies pain to the foot. No fever at home but he was borderline febrile to 100F in the ED. No history of DM. He is not on blood thinners. No known antibiotic allergies. He takes a statin and gabapentin daily. No nausea, vomiting, abdominal pain, diarrhea. He went golfing on 7/20/21 and wonders if he might have picked up the tick at that time. [place]    REVIEW OF SYSTEMS:  Review of Systems   Constitutional: Negative for chills and fever.   HENT: Negative for sore throat.    Eyes: Negative for visual disturbance.   Respiratory:  Negative for shortness of breath.    Cardiovascular: Negative for chest pain.   Gastrointestinal: Negative for abdominal pain, diarrhea, nausea and vomiting.   Endocrine: Negative for polyuria.   Genitourinary: Negative for dysuria and hematuria.        - urinary changes     Musculoskeletal: Negative for joint swelling.        Positive for itching and swelling to the left foot   Skin: Positive for color change (redness to left foot). Negative for rash.   Neurological: Negative for dizziness.   Psychiatric/Behavioral: Negative for confusion.   All other systems reviewed and are negative.            Medical History     Past Medical History:   Diagnosis Date     Hyperlipidemia      Impaired glucose tolerance      Injury, other and unspecified, unspecified site 1998     Osteoarthritis      Overweight(278.02)      Strabismus      Thyroid nodule      Trigger finger        Past Surgical History:   Procedure Laterality Date     ARTHROPLASTY MINIMALLY INVASIVE HIP  4/19/2013    Procedure: ARTHROPLASTY MINIMALLY INVASIVE HIP;  Right Two Incision Total Hip Arthroplasty;  Surgeon: Steve Lopez MD;  Location: [facility] OR     C LEG/ANKLE SURGERY PROC UNLISTED      Ankle arthroscopy, diagnostic     C SHLDR ARTHROSCOP,DIAGNOSTIC      Right shoulder     C SHOULDER SURG PROC UNLISTED      Shoulder Procedure Unlisted     HC CORRECT BUNION,SIMPLE      Bunionectomy, NOS     HC REMOVAL HEEL SPUR, CALCANEUS       HC REPAIR ACHILLES TENDON,PRIMARY       HC REPAIR ACHILLES TENDON,PRIMARY      Description: Primary Repair Of Ruptured Achilles Tendon;  Recorded: 04/16/2014;     HC REPAIR OF NASAL SEPTUM       JOINT REPLACEMENT Bilateral      left hip replacement      2006 by Dr Lopez     OTHER SURGICAL HISTORY      rotator cuff tear     RELEASE TRIGGER FINGER       REMOVAL OF SPERM DUCT(S)  1985    Vasectomy     SHOULDER SURGERY       STRABISMUS SURGERY       TONSILLECTOMY  1960       Family History   Problem Relation Age of Onset      Arthritis Maternal Grandmother      Cancer Maternal Grandfather         bone cancer     Cardiovascular Maternal Grandfather      Cerebrovascular Disease Father      Cardiovascular Father      Rheumatoid Arthritis Mother      Rheumatic fever Mother      Amblyopia Son      Thyroid Disease Sister         thyroidectomy     Glaucoma No family hx of      Macular Degeneration No family hx of      Diabetes No family hx of      Heart Disease Father        Social History     Tobacco Use     Smoking status: Never Smoker     Smokeless tobacco: Never Used     Tobacco comment: quit at age 22   Substance Use Topics     Alcohol use: Yes     Comment: Alcoholic Drinks/day: rare     Drug use: No       cephALEXin (KEFLEX) 500 MG capsule  doxycycline hyclate (VIBRAMYCIN) 100 MG capsule  atorvastatin (LIPITOR) 40 MG tablet  gabapentin (NEURONTIN) 300 MG capsule  meloxicam (MOBIC) 7.5 MG tablet        Physical Exam     Triage Vitals:  ED Triage Vitals [07/25/21 1001]   Enc Vitals Group      /69      Pulse 61      Resp 16      Temp 100  F (37.8  C)      Temp src       SpO2 96 %      Weight 79.4 kg (175 lb)      Height       Head Circumference       Peak Flow       Pain Score       Pain Loc       Pain Edu?       Excl. in GC?         Vitals:  BP (!) 147/78   Pulse 60   Temp 100  F (37.8  C)   Resp 16   Wt 79.4 kg (175 lb)   SpO2 94%   BMI 29.12 kg/m      PHYSICAL EXAM:   Physical Exam  Vitals and nursing note reviewed.   Constitutional:       General: He is not in acute distress.     Appearance: Normal appearance.   HENT:      Head: Normocephalic and atraumatic.      Nose: Nose normal.      Mouth/Throat:      Mouth: Mucous membranes are moist.   Eyes:      Extraocular Movements: Extraocular movements intact.   Cardiovascular:      Rate and Rhythm: Normal rate and regular rhythm.      Pulses: Normal pulses.           Radial pulses are 2+ on the right side and 2+ on the left side.        Dorsalis pedis pulses are 2+ on the right  side and 2+ on the left side.   Pulmonary:      Effort: Pulmonary effort is normal.   Musculoskeletal:      Comments: Left foot, he has small fluid-filled collection distal plantar foot with clear appearing fluid. He has associated mild erythema involving the distal dorsal foot with streaking redness up to distal tib/fib. No circumferential erythema/edema. There is also mild edema involving his mid foot.   Skin:     Findings: No rash.   Neurological:      General: No focal deficit present.      Mental Status: He is alert. Mental status is at baseline.      Comments: Fluent speech   Psychiatric:         Mood and Affect: Mood normal.         Behavior: Behavior normal.           Results     LAB:  All pertinent labs reviewed and interpreted  Labs Ordered and Resulted from Time of ED Arrival Up to the Time of Departure from the ED - No data to display    RADIOLOGY:  No orders to display                ECG:  None    PROCEDURES:   Procedures    PROCEDURE: Incision and Drainage   INDICATIONS: Localized abscess   PROCEDURE PROVIDER: Roman Bernal    SITE: Left foot   MEDICATION: 3 mLs of 1% Lidocaine without epinephrine   NOTE: The area was prepped with betadine and draped off in the usual sterile fashion.  Local anesthetic was injected subcutaneously with anesthesia effects demonstrated prior to proceeding.  The area of maximal fluctuance was opened with a # 15 Blade (Curved Point) using a Single Straight incision to allow for drainage.  The abscess was drained.  The abscess cavity was bluntly explored to separate any loculations. No Packing was placed into the abscess cavity.  A sterile dressing was placed over the area.   COMPLEXITY: Simple    Simple = single, furuncle, paronychia, superficial  Complex = multiple or abscess requiring probing, loculations, packing placement   COMPLICATIONS:  Patient tolerated procedure Patient tolerated procedure well, without complication       FINAL IMPRESSION:    ICD-10-CM    1.  Cellulitis and abscess of foot  L03.119     L02.619    2. Tick bite, initial encounter  W57.XXXA          I, Kinsey Myers, am serving as a scribe to document services personally performed by Dr. Brenardo Bernal, based on my observations and the provider's statements to me. I, Bernardo Bernal, DO attest that Kinsey Myers is acting in a scribe capacity, has observed my performance of the services and has documented them in accordance with my direction.      Bernardo Bernal DO  Emergency Medicine  [facility] EMERGENCY DEPARTMENT  7/25/2021  10:09 AM        Bernardo Bernal MD  07/25/21 1249

## 2021-07-25 NOTE — ED NOTES
Agree with triage note. Patient is pain-free and has had a total of 3 doses of bactrim for this. Onset 4 days ago, swelling to left toes and foot following a tick bite. Redness streaking up the left ankle from the foot noted. Fluid filled collection on the dorsal surface of the left toe and foot with generalized edema.

## 2021-07-26 NOTE — PROGRESS NOTES
"    Assessment & Plan     Cellulitis of left lower extremity  Swelling, redness and erythema all are improving.  Continue to elevate as able, continue cephalexin and doxycycline for cellulitis and possible lyme since started with tic rash.     I would agree with using anti-fungal ointment on toes/foot since athlete's foot may have been contributing factor.     Follow up scheduled next week with myself.    reviewed ED notes which accurately represent history.     \"Pt reports finding a tick on the bottom of his left foot recently, removed it, but noticed increased itching/swelling to foot.  Seen yesterday at urgent care and given bactrim, which he has taken 3 doses now.  Pt reports increased redness streaking up foot.  Initially just on foot and to ankle, but extending slightly further.  Pt with fluid filled collections on plantar foot. Will drain.  Given tick bite with unknown how long it was there, will switch to doxycycline for lyme coverage with MRSA treatment, add keflex for strep coverage and have pt stop bactrim.  Pt does have low grade temp, but quite well appearing with no systemic complaints. We had a long discussion regarding expected course with antibx, f/u in 2 days for re-evaluation and strict return precautions.  Pt agreeable.   \"    No further fevers                 Return in about 1 week (around 8/3/2021) for double book at 11:40 for celluliitis fu .    Joel Daniel Wegener, MD  [facility] [place] Zeng is a 70 year old who presents for the following health issues     HPI     ED/UC Followup:    Facility:  [facility] ED  Date of visit: 7/25/21  Reason for visit: tick bite, cellulitis and abscess of left foot   Current Status: has been getting better since ER visit      See plan for additional hpi      Review of Systems         Objective    BP (!) 140/86 (BP Location: Right arm, Patient Position: Sitting, Cuff Size: Adult Large)   Pulse 56   Temp " 97.6  F (36.4  C) (Tympanic)   Wt 82.1 kg (180 lb 14.4 oz)   SpO2 96%   BMI 30.10 kg/m    Body mass index is 30.1 kg/m .  Physical Exam

## 2021-07-27 ENCOUNTER — OFFICE VISIT (OUTPATIENT)
Dept: FAMILY MEDICINE | Facility: CLINIC | Age: 71
End: 2021-07-27
Payer: MEDICARE

## 2021-07-27 VITALS
WEIGHT: 180.9 LBS | HEART RATE: 56 BPM | BODY MASS INDEX: 30.1 KG/M2 | SYSTOLIC BLOOD PRESSURE: 140 MMHG | OXYGEN SATURATION: 96 % | DIASTOLIC BLOOD PRESSURE: 86 MMHG | TEMPERATURE: 97.6 F

## 2021-07-27 DIAGNOSIS — L03.116 CELLULITIS OF LEFT LOWER EXTREMITY: Primary | ICD-10-CM

## 2021-07-27 DIAGNOSIS — A69.20 LYME DISEASE: ICD-10-CM

## 2021-07-27 PROCEDURE — 99213 OFFICE O/P EST LOW 20 MIN: CPT | Performed by: FAMILY MEDICINE

## 2021-08-03 ENCOUNTER — OFFICE VISIT (OUTPATIENT)
Dept: FAMILY MEDICINE | Facility: CLINIC | Age: 71
End: 2021-08-03
Payer: MEDICARE

## 2021-08-03 VITALS
HEIGHT: 78 IN | OXYGEN SATURATION: 94 % | SYSTOLIC BLOOD PRESSURE: 122 MMHG | WEIGHT: 180 LBS | BODY MASS INDEX: 20.83 KG/M2 | DIASTOLIC BLOOD PRESSURE: 76 MMHG | HEART RATE: 57 BPM

## 2021-08-03 DIAGNOSIS — L03.116 CELLULITIS OF LEFT LOWER EXTREMITY: ICD-10-CM

## 2021-08-03 DIAGNOSIS — B35.3 TINEA PEDIS OF LEFT FOOT: Primary | ICD-10-CM

## 2021-08-03 PROCEDURE — 99213 OFFICE O/P EST LOW 20 MIN: CPT | Performed by: FAMILY MEDICINE

## 2021-08-03 RX ORDER — FLUCONAZOLE 150 MG/1
150 TABLET ORAL DAILY
Qty: 3 TABLET | Refills: 0 | Status: SHIPPED | OUTPATIENT
Start: 2021-08-03 | End: 2021-08-06

## 2021-08-03 ASSESSMENT — MIFFLIN-ST. JEOR: SCORE: 1714.72

## 2021-08-03 NOTE — PATIENT INSTRUCTIONS
Complete the current antibiotics .     Continue antifungal ointment on toes until cracking resolves.     Take fluconazole once daily for three days to help with athlete's foot as well.     Follow up for physical when able.

## 2021-08-03 NOTE — PROGRESS NOTES
"    Assessment & Plan     Tinea pedis of left foot  Complete the current antibiotics .     Continue antifungal ointment on toes until cracking resolves.     Take fluconazole once daily for three days to help with athlete's foot as well.     Follow up for physical when able.       Cellulitis of left lower extremity    25 minutes spent on the date of the encounter doing chart review, history and exam, negotiating and explaining the plan with the patient, documentation and further activities as noted above.                          Return in about 1 month (around 9/3/2021) for wellness/physical.    Joel Daniel Wegener, MD  [facility]    Shelia Zeng is a 70 year old who presents for the following health issues     HPI PATIENT HERE FOR CELLUITIUS F/U FROM 7/27/21        Concern -Left foot celluitius  Overall feels much improved.  No more weeping.  Redness nearly gone.  Still slight itching.         Review of Systems         Objective    /76   Pulse 57   Ht 1.981 m (6' 6\")   Wt 81.6 kg (180 lb)   SpO2 94%   BMI 20.80 kg/m    Body mass index is 20.8 kg/m .  Physical Exam                           "

## 2021-08-06 DIAGNOSIS — B35.3 TINEA PEDIS OF LEFT FOOT: ICD-10-CM

## 2021-08-06 RX ORDER — FLUCONAZOLE 150 MG/1
150 TABLET ORAL DAILY
Qty: 3 TABLET | Refills: 0 | Status: SHIPPED | OUTPATIENT
Start: 2021-08-06

## 2021-08-06 NOTE — TELEPHONE ENCOUNTER
Patient requesting a refill of fluconazole (DIFLUCAN) 150 MG tablet, would like another 3 day supply.  Still has some of the athlete foot left.  Needs today    Please call when done or if you have any questions..  OK to LM on VM    Patient uses [facility] on [facility] in [facility]

## 2021-09-04 ENCOUNTER — HEALTH MAINTENANCE LETTER (OUTPATIENT)
Age: 71
End: 2021-09-04

## 2022-01-20 NOTE — TELEPHONE ENCOUNTER
There are no current regular appt openings besides Reserved and New Patient slots, please advise if ok to schedule in those slots. Or if patient should schedule with another provider, thanks much.    Surgeon: Mary London MPhil, MD

## 2022-01-26 ENCOUNTER — TELEPHONE (OUTPATIENT)
Dept: ORTHOPEDICS | Facility: CLINIC | Age: 72
End: 2022-01-26
Payer: MEDICARE

## 2022-01-26 NOTE — TELEPHONE ENCOUNTER
[facility] Call Center    Phone Message    May a detailed message be left on voicemail: no     Reason for Call: Other: FYI- patient requested Dr. Estrella specifically- has arthritis in knees and that is not on the gird for Dr. Estrella -per protocol appt was still scheduled    Action Taken: Message routed to:  [facility] ([facility]    Travel Screening: Not Applicable

## 2022-01-26 NOTE — TELEPHONE ENCOUNTER
Patient was called back.  He was told that Dr. Estrella does not see new patients with OA and he stated that he is not sure if he has OA.  He has been told different things. He stated that he had an MRI and it showed a degenerative meniscus tear.  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 He was then seen again at [facility] and had more xrays.  He continued to have pain and had and MRI in 12/2021.  He wants to be able to keep walking.  He has not had any injections and does not want to pursue injections at this time.  He was told that we will get his imaging and have Dr. Estrella review them.  He will then be contacted with her recommendations.  He was thankful for the call and agreeable with the plan.

## 2022-01-27 ENCOUNTER — APPOINTMENT (OUTPATIENT)
Dept: ULTRASOUND IMAGING | Facility: HOSPITAL | Age: 72
End: 2022-01-27
Attending: EMERGENCY MEDICINE
Payer: MEDICARE

## 2022-01-27 ENCOUNTER — HOSPITAL ENCOUNTER (EMERGENCY)
Facility: HOSPITAL | Age: 72
Discharge: HOME | End: 2022-01-27
Attending: EMERGENCY MEDICINE | Admitting: EMERGENCY MEDICINE
Payer: MEDICARE

## 2022-01-27 VITALS
HEIGHT: 66 IN | HEART RATE: 71 BPM | DIASTOLIC BLOOD PRESSURE: 77 MMHG | RESPIRATION RATE: 16 BRPM | WEIGHT: 180 LBS | BODY MASS INDEX: 28.93 KG/M2 | TEMPERATURE: 98.9 F | OXYGEN SATURATION: 96 % | SYSTOLIC BLOOD PRESSURE: 153 MMHG

## 2022-01-27 DIAGNOSIS — R25.2 CALF CRAMP: ICD-10-CM

## 2022-01-27 PROCEDURE — 99284 EMERGENCY DEPT VISIT MOD MDM: CPT | Mod: 25

## 2022-01-27 PROCEDURE — 93971 EXTREMITY STUDY: CPT | Mod: LT

## 2022-01-27 ASSESSMENT — MIFFLIN-ST. JEOR: SCORE: 1519.22

## 2022-01-28 ENCOUNTER — TELEPHONE (OUTPATIENT)
Dept: FAMILY MEDICINE | Facility: CLINIC | Age: 72
End: 2022-01-28
Payer: MEDICARE

## 2022-01-28 NOTE — ED TRIAGE NOTES
Pt reports Lt post. Calf pain since flying to [place] 1 month ago, also started knee PT in past week. No thinners.

## 2022-01-28 NOTE — ED PROVIDER NOTES
"EMERGENCY DEPARTMENT ENCOUNTER      NAME: Karri Brady  AGE: 71 year old male  YOB: 1950  MRN: [id number]  EVALUATION DATE & TIME: No admission date for patient encounter.    PCP: Dom Hairston    ED PROVIDER: Jacqueline Langley M.D.      Chief Complaint   Patient presents with     Leg Pain         FINAL IMPRESSION:  1. Calf cramp          ED COURSE & MEDICAL DECISION MAKING:    ED Course as of 01/27/22 2000   u Jan 27, 2022   1906 Pt well appearing and neurovascularly intact on examination with normal VS, amenable to US to r/o DVT   1959 US reassuringly without DVT, pt at this time asymtpomatic which is reassuring and pain free, declines analgesics, and neurovascularly intact. Patient discharged after being provided with extensive anticipatory guidance and given return precautions, importance of PMD follow-up emphasized.        Pertinent Labs & Imaging studies reviewed. (See chart for details)    N95 worn  A face shield was worn also  COVID PPE      At the conclusion of the encounter I discussed the results of all of the tests and the disposition. The questions were answered. The patient or family acknowledged understanding and was agreeable with the care plan.     MEDICATIONS GIVEN IN THE EMERGENCY:  Medications - No data to display    NEW PRESCRIPTIONS STARTED AT TODAY'S ER VISIT  New Prescriptions    No medications on file          =================================================================    HPI      Karri Brady is a 71 year old male with no contributory PMHx who presents to the ED today via private car with complaint of left sided calf pain.     Patient reports left lower calf pain throughout the month of January following his trip to and from [place] on December 30th. He notes that his calf pain is intermittent and \"stabbing\", sometimes radiating into his thigh. Patient is not having any pain currently. He says that his pain has been getting worse over the past few days and he " "experienced some \"terrible\" calf pain today. Patient called the nurse line and was told to come in for CT to rule out a DVT. He has never had this pain before and denies any history of blood clots. Denies leg swelling or loss of sensation to his lower extremity. Patient has an appointment with his primary care provider on February 8th for his pain.  Denies chest pain, shortness of breath, or any other symptoms at this time. No mediations taken for the pain.       REVIEW OF SYSTEMS   All other systems reviewed and are negative except as noted above in HPI.    PAST MEDICAL HISTORY:  Past Medical History:   Diagnosis Date     Hyperlipidemia      Impaired glucose tolerance      Injury, other and unspecified, unspecified site 1998    Fracture left distal radius,Achilles tendon rupture.     Osteoarthritis      Overweight(278.02)      Strabismus      Thyroid nodule      Trigger finger     mutliple operations for this       PAST SURGICAL HISTORY:  Past Surgical History:   Procedure Laterality Date     ARTHROPLASTY MINIMALLY INVASIVE HIP  4/19/2013    Procedure: ARTHROPLASTY MINIMALLY INVASIVE HIP;  Right Two Incision Total Hip Arthroplasty;  Surgeon: Steve Lopez MD;  Location:  OR     C SHLDR ARTHROSCOP,DIAGNOSTIC      Right shoulder     HC CORRECT BUNION,SIMPLE      Bunionectomy, NOS     HC REMOVAL HEEL SPUR, CALCANEUS       HC REPAIR ACHILLES TENDON,PRIMARY       HC REPAIR ACHILLES TENDON,PRIMARY      Description: Primary Repair Of Ruptured Achilles Tendon;  Recorded: 04/16/2014;     HC REPAIR OF NASAL SEPTUM       JOINT REPLACEMENT Bilateral      left hip replacement      2006 by Dr Lopez     OTHER SURGICAL HISTORY      rotator cuff tear     RELEASE TRIGGER FINGER       REMOVAL OF SPERM DUCT(S)  1985    Vasectomy     SHOULDER SURGERY       STRABISMUS SURGERY       TONSILLECTOMY  1960     [facility] LEG/ANKLE SURGERY PROC UNLISTED      Ankle arthroscopy, diagnostic     [facility] SHOULDER SURG PROC UNLISTED      Shoulder " Procedure Unlisted       CURRENT MEDICATIONS:    atorvastatin (LIPITOR) 40 MG tablet  fluconazole (DIFLUCAN) 150 MG tablet  gabapentin (NEURONTIN) 300 MG capsule  meloxicam (MOBIC) 7.5 MG tablet        ALLERGIES:  Allergies   Allergen Reactions     Bee Venom Swelling     No Known Drug Allergies        FAMILY HISTORY:  Family History   Problem Relation Age of Onset     Arthritis Maternal Grandmother      Cancer Maternal Grandfather         bone cancer     Cardiovascular Maternal Grandfather      Cerebrovascular Disease Father      Cardiovascular Father      Rheumatoid Arthritis Mother      Rheumatic fever Mother      Amblyopia Son      Thyroid Disease Sister         thyroidectomy     Glaucoma No family hx of      Macular Degeneration No family hx of      Diabetes No family hx of      Heart Disease Father        SOCIAL HISTORY:   Social History     Socioeconomic History     Marital status:      Spouse name:      Number of children: 1     Years of education: Not on file     Highest education level: Not on file   Occupational History     Employer: [employer]     Comment:    Tobacco Use     Smoking status: Never Smoker     Smokeless tobacco: Never Used     Tobacco comment: quit at age 22   Substance and Sexual Activity     Alcohol use: Yes     Comment: Alcoholic Drinks/day: rare     Drug use: No     Sexual activity: Not on file   Other Topics Concern     Parent/sibling w/ CABG, MI or angioplasty before 65F 55M? No   Social History Narrative    Dairy/d 3 servings/d.     Caffeine 8 servings/d    Exercise 2 x week    Sunscreen used - No    Seatbelts used - Yes    Working smoke/CO detectors in the home - Yes    Guns stored in the home - No    Self Breast Exams - NOT APPLICABLE    Self Testicular Exam - No    Eye Exam up to date - No    Dental Exam up to date - Yes    Pap Smear up to date - NOT APPLICABLE    Mammogram up to date - NOT APPLICABLE    PSA up to date - No-2001    Dexa  "Scan up to date - NOT APPLICABLE    Flex Sig / Colonoscopy up to date - Yes 2002, had polyp- due 2007    Immunizations up to date - Yes 2004    Abuse: Current or Past(Physical, Sexual or Emotional)- No    Do you feel safe in your environment - Yes    2005     Social Determinants of Health     Financial Resource Strain: Not on file   Food Insecurity: Not on file   Transportation Needs: Not on file   Physical Activity: Not on file   Stress: Not on file   Social Connections: Not on file   Intimate Partner Violence: Not on file   Housing Stability: Not on file       VITALS:  Patient Vitals for the past 24 hrs:   BP Temp Temp src Pulse Resp SpO2 Height Weight   01/27/22 1857 (!) 153/77 98.9  F (37.2  C) Temporal 71 16 96 % 1.676 m (5' 6\") 81.6 kg (180 lb)       PHYSICAL EXAM    GENERAL: Awake, alert.  In no acute distress.   HEENT: Normocephalic, atraumatic.  Pupils equal, round and reactive.  Conjunctiva normal.  EOMI.  NECK: No stridor or apparent deformity.  PULMONARY: Symmetrical breath sounds without distress.  Lungs clear to auscultation bilaterally without wheezes, rhonchi or rales.  CARDIO: Regular rate and rhythm.  No significant murmur, rub or gallop.  Radial pulses strong and symmetrical.  EXTREMITIES: No lower extremity swelling or edema.  bilateral 2+ pedis pulses and bounding  NEURO: Alert and oriented to person, place and time.  Cranial nerves grossly intact.  No focal motor deficit.  PSYCH: Normal mood and affect  SKIN: No rashes        LAB:  All pertinent labs reviewed and interpreted.  Results for orders placed or performed during the hospital encounter of 01/27/22   US Lower Extremity Venous Duplex Left    Impression    IMPRESSION:  1.  No deep venous thrombosis in the left lower extremity.       RADIOLOGY:  Reviewed all pertinent imaging. Please see official radiology report.  US Lower Extremity Venous Duplex Left   Final Result   IMPRESSION:   1.  No deep venous thrombosis in the left lower " extremity.              I, Porfirio Banks, am serving as a scribe to document services personally performed by Dr. Jacqueline Langley based on my observation and the provider's statements to me. I, Jacqueline Langley MD attest that Porfirio Banks is acting in a scribe capacity, has observed my performance of the services and has documented them in accordance with my direction.       Jacqueline Langley MD  01/27/22 2000

## 2022-01-28 NOTE — ED NOTES
ED Provider In Triage Note  [facility]  Encounter Date: Jan 27, 2022    No chief complaint on file.      Brief HPI:   Karri Brady is a 71 year old male presenting to the Emergency Department with a chief complaint of left calf pain and posterior knee pain that is been going on for the past several days.  Patient concern for potential blood clot of the leg.  Denies significant swelling or fever.  Denies trauma.  Pain started after a trip back from [place] about a month ago, was aggravated by physical therapy yesterday.    Brief Physical Exam:  There were no vitals taken for this visit.  General: Non-toxic appearing  HEENT: Atraumatic  Resp: No respiratory distress  Abdomen: Non-peritoneal  Neuro: Alert, oriented, answers questions appropriately  Psych: Behavior appropriate      Plan Initiated in Triage:  Orders Placed This Encounter     US Lower Extremity Venous Duplex Left       PIT Dispo:   Return to [facility] while awaiting workup and ED bed availability    VICTORINA SANDRA DO on 1/27/2022 at 6:54 PM    Patient was evaluated by the Physician in Triage due to a limitation of available rooms in the Emergency Department. A plan of care was discussed based on the information obtained on the initial evaluation and patient was consuled to return back to the Emergency Department lob after this initial evalutaiton until results were obtained or a room became available in the Emergency Department. Patient was counseled not to leave prior to receiving the results of their workup.        Victorina Sandra DO  01/27/22 [id number]

## 2022-01-28 NOTE — TELEPHONE ENCOUNTER
MONIQUE CRUZ unless you have more to add.    Pt was seen in ER yesterday for calf pain and blood clot was ruled out per pt.  He says he was told to f/u with you today.  He is going to go to PT that was ordered and will let us know if he needs anything else.    If anything else, please advise otherwise he does not need a call back and he will let us know if he needs anything else.    Thanks,  Claudia Aceves RN

## 2022-02-04 ENCOUNTER — TELEPHONE (OUTPATIENT)
Dept: ORTHOPEDICS | Facility: CLINIC | Age: 72
End: 2022-02-04
Payer: MEDICARE

## 2022-02-04 NOTE — TELEPHONE ENCOUNTER
[facility] Call Center    Phone Message    May a detailed message be left on voicemail: yes     Reason for Call: Other: pt has questions on how to procedure after the scans have been sent over/ said someone would be reviewing them to let him know what to do next  He had originally called on 01/28 and said someone would be contacting him and to wait 48 hrs so he hasn't heard now he is following up.         Action Taken: ortho

## 2022-02-11 ENCOUNTER — TRANSFERRED RECORDS (OUTPATIENT)
Dept: HEALTH INFORMATION MANAGEMENT | Facility: CLINIC | Age: 72
End: 2022-02-11
Payer: MEDICARE

## 2022-02-17 ENCOUNTER — TELEPHONE (OUTPATIENT)
Dept: ORTHOPEDICS | Facility: CLINIC | Age: 72
End: 2022-02-17
Payer: MEDICARE

## 2022-02-17 NOTE — TELEPHONE ENCOUNTER
Patient was called back. Dr. Estrella reviewed his imaging and stated that she would be happy to see him.  He stated that he contacted another provider in the mean time and is going to have his meniscus taken care of next week.  He questioned if he should keep his appt with Dr. Estrella.  He was told that if down the line he feels that he would need to talk with Dr. Estrella that can be readdressed.  He was agreeable and his appointment was cancelled.  He was thankful for the call and apologized to for the delay.

## 2022-02-19 ENCOUNTER — HEALTH MAINTENANCE LETTER (OUTPATIENT)
Age: 72
End: 2022-02-19

## 2022-03-01 ENCOUNTER — TRANSFERRED RECORDS (OUTPATIENT)
Dept: HEALTH INFORMATION MANAGEMENT | Facility: CLINIC | Age: 72
End: 2022-03-01

## 2022-03-08 ENCOUNTER — TRANSFERRED RECORDS (OUTPATIENT)
Dept: HEALTH INFORMATION MANAGEMENT | Facility: CLINIC | Age: 72
End: 2022-03-08
Payer: MEDICARE

## 2022-05-24 ENCOUNTER — TRANSFERRED RECORDS (OUTPATIENT)
Dept: HEALTH INFORMATION MANAGEMENT | Facility: CLINIC | Age: 72
End: 2022-05-24
Payer: MEDICARE

## 2022-08-18 ENCOUNTER — TRANSFERRED RECORDS (OUTPATIENT)
Dept: HEALTH INFORMATION MANAGEMENT | Facility: CLINIC | Age: 72
End: 2022-08-18

## 2022-10-22 ENCOUNTER — HEALTH MAINTENANCE LETTER (OUTPATIENT)
Age: 72
End: 2022-10-22

## 2023-04-01 ENCOUNTER — HEALTH MAINTENANCE LETTER (OUTPATIENT)
Age: 73
End: 2023-04-01

## 2023-04-05 ENCOUNTER — TRANSFERRED RECORDS (OUTPATIENT)
Dept: HEALTH INFORMATION MANAGEMENT | Facility: CLINIC | Age: 73
End: 2023-04-05
Payer: MEDICARE

## 2023-04-08 ENCOUNTER — HOSPITAL ENCOUNTER (EMERGENCY)
Facility: HOSPITAL | Age: 73
Discharge: LEFT BEFORE DISCHARGE | End: 2023-04-08
Attending: EMERGENCY MEDICINE | Admitting: EMERGENCY MEDICINE
Payer: MEDICARE

## 2023-04-08 VITALS
HEART RATE: 52 BPM | DIASTOLIC BLOOD PRESSURE: 83 MMHG | WEIGHT: 169 LBS | BODY MASS INDEX: 27.28 KG/M2 | RESPIRATION RATE: 16 BRPM | SYSTOLIC BLOOD PRESSURE: 161 MMHG | OXYGEN SATURATION: 96 % | TEMPERATURE: 98.5 F

## 2023-04-08 DIAGNOSIS — R06.6 HICCUPS: ICD-10-CM

## 2023-04-08 PROCEDURE — 93005 ELECTROCARDIOGRAM TRACING: CPT

## 2023-04-08 PROCEDURE — 999N000104 HC STATISTIC NO CHARGE

## 2023-04-08 PROCEDURE — 99281 EMR DPT VST MAYX REQ PHY/QHP: CPT

## 2023-04-08 NOTE — ED NOTES
Patient called x2 at 1350 and 1403; patient not seen by provider per Dr. Shaw.  Not in Waiting room.

## 2023-04-08 NOTE — ED TRIAGE NOTES
Pt has had hiccups for three days. No other symptoms. Tried Prilosec with no relief. Pt states this has happened before and went away after 5 days.      Triage Assessment     Row Name 04/08/23 1305       Triage Assessment (Adult)    Airway WDL WDL       Respiratory WDL    Respiratory WDL WDL       Skin Circulation/Temperature WDL    Skin Circulation/Temperature WDL WDL       Cardiac WDL    Cardiac WDL WDL       Peripheral/Neurovascular WDL    Peripheral Neurovascular WDL WDL       Cognitive/Neuro/Behavioral WDL    Cognitive/Neuro/Behavioral WDL WDL

## 2023-10-30 ENCOUNTER — TRANSFERRED RECORDS (OUTPATIENT)
Dept: HEALTH INFORMATION MANAGEMENT | Facility: CLINIC | Age: 73
End: 2023-10-30

## 2024-04-08 ENCOUNTER — TRANSFERRED RECORDS (OUTPATIENT)
Dept: HEALTH INFORMATION MANAGEMENT | Facility: CLINIC | Age: 74
End: 2024-04-08

## 2024-04-23 ENCOUNTER — OFFICE VISIT (OUTPATIENT)
Dept: OPHTHALMOLOGY | Facility: CLINIC | Age: 74
End: 2024-04-23
Attending: OPHTHALMOLOGY
Payer: MEDICARE

## 2024-04-23 DIAGNOSIS — Z13.5 SCREENING FOR EYE CONDITION: Primary | ICD-10-CM

## 2024-04-23 DIAGNOSIS — H40.003 GLAUCOMA SUSPECT OF BOTH EYES: ICD-10-CM

## 2024-04-23 PROCEDURE — 92015 DETERMINE REFRACTIVE STATE: CPT | Mod: GY

## 2024-04-23 PROCEDURE — G0463 HOSPITAL OUTPT CLINIC VISIT: HCPCS | Performed by: OPHTHALMOLOGY

## 2024-04-23 PROCEDURE — 92004 COMPRE OPH EXAM NEW PT 1/>: CPT | Performed by: OPHTHALMOLOGY

## 2024-04-23 ASSESSMENT — VISUAL ACUITY
OD_CC: 20/20
OS_CC: 20/20
CORRECTION_TYPE: GLASSES
OS_CC+: -1
METHOD: SNELLEN - LINEAR

## 2024-04-23 ASSESSMENT — CONF VISUAL FIELD
OD_SUPERIOR_TEMPORAL_RESTRICTION: 0
OD_INFERIOR_TEMPORAL_RESTRICTION: 0
OS_NORMAL: 1
OS_INFERIOR_NASAL_RESTRICTION: 0
OS_INFERIOR_TEMPORAL_RESTRICTION: 0
OD_INFERIOR_NASAL_RESTRICTION: 0
METHOD: COUNTING FINGERS
OD_NORMAL: 1
OD_SUPERIOR_NASAL_RESTRICTION: 0
OS_SUPERIOR_NASAL_RESTRICTION: 0
OS_SUPERIOR_TEMPORAL_RESTRICTION: 0

## 2024-04-23 ASSESSMENT — REFRACTION_MANIFEST
OD_ADD: +2.50
OD_SPHERE: -3.25
OS_AXIS: 005
OS_CYLINDER: +2.00
OS_SPHERE: -5.25
OS_ADD: +2.50
OD_CYLINDER: +1.25
OD_AXIS: 155

## 2024-04-23 ASSESSMENT — TONOMETRY
IOP_METHOD: ICARE
OD_IOP_MMHG: 17
OS_IOP_MMHG: 21

## 2024-04-23 ASSESSMENT — REFRACTION_WEARINGRX
OS_AXIS: 001
OD_SPHERE: -3.00
OD_AXIS: 158
OD_ADD: +2.50
SPECS_TYPE: PAL
OD_CYLINDER: +1.25
OS_SPHERE: -5.50
OS_ADD: +2.50
OS_CYLINDER: +2.25

## 2024-04-23 ASSESSMENT — EXTERNAL EXAM - RIGHT EYE: OD_EXAM: NORMAL

## 2024-04-23 ASSESSMENT — CUP TO DISC RATIO
OD_RATIO: 0.05
OS_RATIO: 0.2

## 2024-04-23 ASSESSMENT — SLIT LAMP EXAM - LIDS
COMMENTS: MGD, BLEPHARITIS
COMMENTS: MGD, BLEPHARITIS

## 2024-04-23 ASSESSMENT — EXTERNAL EXAM - LEFT EYE: OS_EXAM: NORMAL

## 2024-04-23 NOTE — NURSING NOTE
Chief Complaints and History of Present Illnesses   Patient presents with    COMPREHENSIVE EYE EXAM     Chief Complaint(s) and History of Present Illness(es)       COMPREHENSIVE EYE EXAM              Laterality: both eyes    Course: gradually worsening    Associated symptoms: dryness and eye pain.  Negative for flashes and floaters    Treatments tried: eye drops              Comments    Karri Brady is a(n) 73 year old male who presents for a comprehensive exam. Last eye exam was 1 year(s) ago. Since exam, vision has decreased. Uses drops as instructed. No flashes and floaters. Some dryness with occasional eye pain. No diplopia.    Gómez Burden COT 10:14 AM April 23, 2024

## 2024-04-23 NOTE — PROGRESS NOTES
Chief Complaint(s) and History of Present Illness(es)     COMPREHENSIVE EYE EXAM            Laterality: both eyes    Course: gradually worsening    Associated symptoms: dryness and eye pain.  Negative for flashes and   floaters    Treatments tried: eye drops          Comments    Karri Brady is a(n) 73 year old male who presents for a comprehensive   exam. Last eye exam was 1 year(s) ago. Since exam, vision has decreased.   Uses drops as instructed. No flashes and floaters. Some dryness with   occasional eye pain. No diplopia.    Gómez Burden COT 10:14 AM April 23, 2024           Review of systems for the eyes was negative other than the pertinent positives/negatives listed in the HPI.      Assessment & Plan      Karri Brady is a 73 year old male with the following diagnoses:   1. Screening for eye condition    2. Glaucoma suspect of both eyes         New patient, previously seen by Dr. Lomax in 2017  Using xiidra twice a day both eyes   Can use artificial tears and warm compresses as needed     Early cataract left eye > right eye   Not visually significant   Return precautions reviewed     Left prior to OCT Nerve fiber layer today.  Will get baseline next visit  Will obtain [facility] records   Low risk, ok to monitor for now    Refractive options reviewed  Refraction given     Patient disposition:   Return in about 6 months (around 10/23/2024) for VT only, OCT NFL, 24-2 Dynamic VF.           Attending Physician Attestation:  Complete documentation of historical and exam elements from today's encounter can be found in the full encounter summary report (not reduplicated in this progress note).  I personally obtained the chief complaint(s) and history of present illness.  I confirmed and edited as necessary the review of systems, past medical/surgical history, family history, social history, and examination findings as documented by others; and I examined the patient myself.  I personally reviewed the relevant tests,  images, and reports as documented above.  I formulated and edited as necessary the assessment and plan and discussed the findings and management plan with the patient and family. . - Eliecer Caraballo MD

## 2024-05-30 ENCOUNTER — TRANSFERRED RECORDS (OUTPATIENT)
Dept: HEALTH INFORMATION MANAGEMENT | Facility: CLINIC | Age: 74
End: 2024-05-30
Payer: MEDICARE

## 2024-06-02 ENCOUNTER — HEALTH MAINTENANCE LETTER (OUTPATIENT)
Age: 74
End: 2024-06-02

## 2024-06-13 ENCOUNTER — OFFICE VISIT (OUTPATIENT)
Dept: CARDIOLOGY | Facility: CLINIC | Age: 74
End: 2024-06-13
Payer: MEDICARE

## 2024-06-13 VITALS
DIASTOLIC BLOOD PRESSURE: 79 MMHG | OXYGEN SATURATION: 94 % | HEIGHT: 66 IN | BODY MASS INDEX: 27.64 KG/M2 | HEART RATE: 50 BPM | WEIGHT: 172 LBS | SYSTOLIC BLOOD PRESSURE: 141 MMHG

## 2024-06-13 DIAGNOSIS — M79.662 PAIN IN BOTH LOWER LEGS: Primary | ICD-10-CM

## 2024-06-13 DIAGNOSIS — R09.89 OTHER SPECIFIED SYMPTOMS AND SIGNS INVOLVING THE CIRCULATORY AND RESPIRATORY SYSTEMS: ICD-10-CM

## 2024-06-13 DIAGNOSIS — M79.661 PAIN IN BOTH LOWER LEGS: Primary | ICD-10-CM

## 2024-06-13 DIAGNOSIS — Z82.49 FAMILY HISTORY OF ISCHEMIC HEART DISEASE: ICD-10-CM

## 2024-06-13 PROCEDURE — 93000 ELECTROCARDIOGRAM COMPLETE: CPT | Performed by: INTERNAL MEDICINE

## 2024-06-13 PROCEDURE — 99204 OFFICE O/P NEW MOD 45 MIN: CPT | Performed by: INTERNAL MEDICINE

## 2024-06-13 NOTE — PROGRESS NOTES
Cardiology Consultation      Karri Brady MRN# [id number]   YOB: 1950 Age: 73 year old   Date of Visit 06/13/2024     Reason for consult: Leg pain           Assessment and Plan:     Leg pain, patient self-referred for concern of peripheral arterial disease given family history of vascular disease  Symptoms are not exertional and are intermittent, improved with gabapentin and steroid injection.  Normal peripheral pulses and no lack of stamina.  Aortoiliac ultrasound without obstruction or aneurysm.  It is reasonable given his family history of peripheral arterial disease and personal history of hyperlipidemia to assess for obstruction with exercise PLACIDO.      Hyperlipidemia  Patient currently on atorvastatin, he has some concerns about mood and memory.  [facility] changing to rosuvastatin which is more hydrophilic and tends to have less mood and memory issues.  Also if it is contributing to his leg discomfort (unlikely) changing from atorvastatin to rosuvastatin may have a benefit.    45 minutes spent today in review of past medical record, discussion with patient and postvisit charting    This note was transcribed using electronic voice recognition software, typographical errors may be present.                    Chief Complaint:   New Patient (Self referral for possible PAD/HLD/Family hx of heart disease/)           History of Present Illness:   This patient is a 73 year old male self-referred for concerns of vascular disease.  He has intermittent stabbing left thigh and lower leg discomfort that has resolved.  It improved with gabapentin and steroid injection of the back.    It is not exertional.  He has no loss of power.  No dyspnea on exertion or exertional lightheadedness.  Pulses peripheral dorsalis pedis are normal.  There is trace lower extremity edema bilaterally and no varicosities.    He has had aortoiliac ultrasound on 4/8/2024 with normal caliber  "and no obstructive disease and no aneurysm.    He is somewhat bradycardic in the office today but denies any symptoms of orthostasis or exertional dyspnea/lightheadedness.         Physical Exam:     Vitals: BP (!) 141/79   Pulse 50   Ht 1.676 m (5' 6\")   Wt 78 kg (172 lb)   SpO2 94%   BMI 27.76 kg/m    Constitutional:  cooperative, alert and oriented, well developed, well nourished, in no acute distress        Skin:  warm and dry to the touch, no apparent skin lesions or masses noted        Head:  normocephalic, no masses or lesions        Eyes:  pupils equal and round, conjunctivae and lids unremarkable, sclera white, no xanthalasma, EOMS intact, no nystagmus        ENT:  no pallor or cyanosis        Neck:  JVP normal        Chest:  normal respiratory excursion        Cardiac: regular rhythm;normal S1 and S2;no murmurs, gallops or rubs detected bradycardic                Abdomen:  BS normoactive        Extremities and Back:  no deformities, clubbing, cyanosis, erythema observed        Neurological:  no gross motor deficits                      Past Medical History:   I have reviewed this patient's past medical history  Past Medical History:   Diagnosis Date    Hyperlipidemia     Impaired glucose tolerance     Injury, other and unspecified, unspecified site 1998    Fracture left distal radius,Achilles tendon rupture.    Osteoarthritis     Overweight(278.02)     Strabismus     Thyroid nodule     Trigger finger     mutliple operations for this             Past Surgical History:   I have reviewed this patient's past surgical history  Past Surgical History:   Procedure Laterality Date    ARTHROPLASTY MINIMALLY INVASIVE HIP  4/19/2013    Procedure: ARTHROPLASTY MINIMALLY INVASIVE HIP;  Right Two Incision Total Hip Arthroplasty;  Surgeon: Steve Lopez MD;  Location: [facility] OR    C SHLDR ARTHROSCOP,DIAGNOSTIC      Right shoulder    HC CORRECT BUNION,SIMPLE      Bunionectomy, NOS    HC REMOVAL HEEL SPUR, CALCANEUS  "     HC REPAIR ACHILLES TENDON,PRIMARY      HC REPAIR ACHILLES TENDON,PRIMARY      Description: Primary Repair Of Ruptured Achilles Tendon;  Recorded: 04/16/2014;    HC REPAIR OF NASAL SEPTUM      JOINT REPLACEMENT Bilateral     left hip replacement      2006 by Dr Lopez    OTHER SURGICAL HISTORY      rotator cuff tear    RELEASE TRIGGER FINGER      REMOVAL OF SPERM DUCT(S)  1985    Vasectomy    SHOULDER SURGERY      STRABISMUS SURGERY      TONSILLECTOMY  1960    [facility] LEG/ANKLE SURGERY PROC UNLISTED      Ankle arthroscopy, diagnostic    [facility] SHOULDER SURG PROC UNLISTED      Shoulder Procedure Unlisted               Social History:   I have reviewed this patient's social history  Social History     Tobacco Use    Smoking status: Never    Smokeless tobacco: Never    Tobacco comments:     quit at age 22   Substance Use Topics    Alcohol use: Yes     Comment: Alcoholic Drinks/day: rare             Family History:   I have reviewed this patient's family history  Family History   Problem Relation Age of Onset    Arthritis Maternal Grandmother     Cancer Maternal Grandfather         bone cancer    Cardiovascular Maternal Grandfather     Cerebrovascular Disease Father     Cardiovascular Father     Rheumatoid Arthritis Mother     Rheumatic fever Mother     Amblyopia Son     Thyroid Disease Sister         thyroidectomy    Glaucoma No family hx of     Macular Degeneration No family hx of     Diabetes No family hx of     Heart Disease Father              Allergies:     Allergies   Allergen Reactions    Bee Venom Swelling    No Known Drug Allergy              Medications:   I have reviewed this patient's current medications  Current Outpatient Medications   Medication Sig Dispense Refill    atorvastatin (LIPITOR) 40 MG tablet Take 80 mg by mouth daily      fluconazole (DIFLUCAN) 150 MG tablet Take 1 tablet (150 mg) by mouth daily 3 tablet 0    gabapentin (NEURONTIN) 300 MG capsule 600 mg Taking 600 mg daily      meloxicam (MOBIC)  7.5 MG tablet as needed                 Review of Systems:       Review of Systems:  Skin:        Eyes:  Positive for glasses  ENT:       Respiratory:  Negative    Cardiovascular:  Negative    Gastroenterology:      Genitourinary:       Musculoskeletal:  Positive for    Neurologic:       Psychiatric:       Heme/Lymph/Imm:       Endocrine:                          Data:   All available laboratory data reviewed  Lab Results   Component Value Date    CHOL 156 06/03/2019    CHOL 218 08/14/2007     Lab Results   Component Value Date    HDL 44 06/03/2019    HDL 44 08/14/2007     Lab Results   Component Value Date     06/03/2019     08/14/2007     Lab Results   Component Value Date    TRIG 60 06/03/2019    TRIG 114 08/14/2007     Lab Results   Component Value Date    CHOLHDLRATIO 4.9 08/14/2007     TSH   Date Value Ref Range Status   07/26/2007 0.73 0.4 - 5.0 mU/L Final     Last Basic Metabolic Panel:  Lab Results   Component Value Date     05/04/2020     04/26/2013      Lab Results   Component Value Date    POTASSIUM 4.0 05/04/2020    POTASSIUM 4.4 04/26/2013     Lab Results   Component Value Date    CHLORIDE 106 05/04/2020    CHLORIDE 102 04/26/2013     Lab Results   Component Value Date    DEVIN 8.9 05/04/2020    DEVIN 8.4 04/26/2013     Lab Results   Component Value Date    CO2 23 05/04/2020    CO2 28 04/26/2013     Lab Results   Component Value Date    BUN 11 05/04/2020    BUN 8 04/26/2013     Lab Results   Component Value Date    CR 0.71 05/04/2020    CR 0.65 04/26/2013     Lab Results   Component Value Date    GLC 87 05/04/2020    GLC 79 04/26/2013     Lab Results   Component Value Date    WBC 5.9 04/22/2013     Lab Results   Component Value Date    RBC 3.22 04/22/2013     Lab Results   Component Value Date    HGB 15.1 05/04/2020    HGB 10.7 04/26/2013     Lab Results   Component Value Date    HCT 29.1 04/22/2013     Lab Results   Component Value Date    MCV 90 04/22/2013     Lab Results    Component Value Date    MCH 31.4 04/22/2013     Lab Results   Component Value Date    MCHC 34.7 04/22/2013     Lab Results   Component Value Date    RDW 12.2 04/22/2013     Lab Results   Component Value Date     04/22/2013

## 2024-06-13 NOTE — LETTER
6/13/2024    Dom Stone, DO  [address]  [facility] [address]    RE: Karri Brady       Dear Colleague,     I had the pleasure of seeing Kar[facility].  Cardiology Consultation      Karri Brady MRN# [id number]   YOB: 1950 Age: 73 year old   Date of Visit 06/13/2024     Reason for consult: Leg pain           Assessment and Plan:     Leg pain, patient self-referred for concern of peripheral arterial disease given family history of vascular disease  Symptoms are not exertional and are intermittent, improved with gabapentin and steroid injection.  Normal peripheral pulses and no lack of stamina.  Aortoiliac ultrasound without obstruction or aneurysm.  It is reasonable given his family history of peripheral arterial disease and personal history of hyperlipidemia to assess for obstruction with exercise PLACIDO.      Hyperlipidemia  Patient currently on atorvastatin, he has some concerns about mood and memory.  [facility] changing to rosuvastatin which is more hydrophilic and tends to have less mood and memory issues.  Also if it is contributing to his leg discomfort (unlikely) changing from atorvastatin to rosuvastatin may have a benefit.    45 minutes spent today in review of past medical record, discussion with patient and postvisit charting    This note was transcribed using electronic voice recognition software, typographical errors may be present.                    Chief Complaint:   New Patient (Self referral for possible PAD/HLD/Family hx of heart disease/)           History of Present Illness:   This patient is a 73 year old male self-referred for concerns of vascular disease.  He has intermittent stabbing left thigh and lower leg discomfort that has resolved.  It improved with gabapentin and steroid injection of the back.    It is not exertional.  He has no loss of power.  No dyspnea on  "exertion or exertional lightheadedness.  Pulses peripheral dorsalis pedis are normal.  There is trace lower extremity edema bilaterally and no varicosities.    He has had aortoiliac ultrasound on 4/8/2024 with normal caliber and no obstructive disease and no aneurysm.    He is somewhat bradycardic in the office today but denies any symptoms of orthostasis or exertional dyspnea/lightheadedness.         Physical Exam:     Vitals: BP (!) 141/79   Pulse 50   Ht 1.676 m (5' 6\")   Wt 78 kg (172 lb)   SpO2 94%   BMI 27.76 kg/m    Constitutional:  cooperative, alert and oriented, well developed, well nourished, in no acute distress        Skin:  warm and dry to the touch, no apparent skin lesions or masses noted        Head:  normocephalic, no masses or lesions        Eyes:  pupils equal and round, conjunctivae and lids unremarkable, sclera white, no xanthalasma, EOMS intact, no nystagmus        ENT:  no pallor or cyanosis        Neck:  JVP normal        Chest:  normal respiratory excursion        Cardiac: regular rhythm;normal S1 and S2;no murmurs, gallops or rubs detected bradycardic                Abdomen:  BS normoactive        Extremities and Back:  no deformities, clubbing, cyanosis, erythema observed        Neurological:  no gross motor deficits                      Past Medical History:   I have reviewed this patient's past medical history  Past Medical History:   Diagnosis Date    Hyperlipidemia     Impaired glucose tolerance     Injury, other and unspecified, unspecified site 1998    Fracture left distal radius,Achilles tendon rupture.    Osteoarthritis     Overweight(278.02)     Strabismus     Thyroid nodule     Trigger finger     mutliple operations for this             Past Surgical History:   I have reviewed this patient's past surgical history  Past Surgical History:   Procedure Laterality Date    ARTHROPLASTY MINIMALLY INVASIVE HIP  4/19/2013    Procedure: ARTHROPLASTY MINIMALLY INVASIVE HIP;  Right Two " Incision Total Hip Arthroplasty;  Surgeon: Steve Lopez MD;  Location: [facility] OR    C SHLDR ARTHROSCOP,DIAGNOSTIC      Right shoulder    HC CORRECT BUNION,SIMPLE      Bunionectomy, NOS    HC REMOVAL HEEL SPUR, CALCANEUS      HC REPAIR ACHILLES TENDON,PRIMARY      HC REPAIR ACHILLES TENDON,PRIMARY      Description: Primary Repair Of Ruptured Achilles Tendon;  Recorded: 04/16/2014;    HC REPAIR OF NASAL SEPTUM      JOINT REPLACEMENT Bilateral     left hip replacement      2006 by Dr Lopez    OTHER SURGICAL HISTORY      rotator cuff tear    RELEASE TRIGGER FINGER      REMOVAL OF SPERM DUCT(S)  1985    Vasectomy    SHOULDER SURGERY      STRABISMUS SURGERY      TONSILLECTOMY  1960    [facility] LEG/ANKLE SURGERY PROC UNLISTED      Ankle arthroscopy, diagnostic    [facility] SHOULDER SURG PROC UNLISTED      Shoulder Procedure Unlisted               Social History:   I have reviewed this patient's social history  Social History     Tobacco Use    Smoking status: Never    Smokeless tobacco: Never    Tobacco comments:     quit at age 22   Substance Use Topics    Alcohol use: Yes     Comment: Alcoholic Drinks/day: rare             Family History:   I have reviewed this patient's family history  Family History   Problem Relation Age of Onset    Arthritis Maternal Grandmother     Cancer Maternal Grandfather         bone cancer    Cardiovascular Maternal Grandfather     Cerebrovascular Disease Father     Cardiovascular Father     Rheumatoid Arthritis Mother     Rheumatic fever Mother     Amblyopia Son     Thyroid Disease Sister         thyroidectomy    Glaucoma No family hx of     Macular Degeneration No family hx of     Diabetes No family hx of     Heart Disease Father              Allergies:     Allergies   Allergen Reactions    Bee Venom Swelling    No Known Drug Allergy              Medications:   I have reviewed this patient's current medications  Current Outpatient Medications   Medication Sig Dispense Refill    atorvastatin  (LIPITOR) 40 MG tablet Take 80 mg by mouth daily      fluconazole (DIFLUCAN) 150 MG tablet Take 1 tablet (150 mg) by mouth daily 3 tablet 0    gabapentin (NEURONTIN) 300 MG capsule 600 mg Taking 600 mg daily      meloxicam (MOBIC) 7.5 MG tablet as needed                 Review of Systems:       Review of Systems:  Skin:        Eyes:  Positive for glasses  ENT:       Respiratory:  Negative    Cardiovascular:  Negative    Gastroenterology:      Genitourinary:       Musculoskeletal:  Positive for    Neurologic:       Psychiatric:       Heme/Lymph/Imm:       Endocrine:                          Data:   All available laboratory data reviewed  Lab Results   Component Value Date    CHOL 156 06/03/2019    CHOL 218 08/14/2007     Lab Results   Component Value Date    HDL 44 06/03/2019    HDL 44 08/14/2007     Lab Results   Component Value Date     06/03/2019     08/14/2007     Lab Results   Component Value Date    TRIG 60 06/03/2019    TRIG 114 08/14/2007     Lab Results   Component Value Date    CHOLHDLRATIO 4.9 08/14/2007     TSH   Date Value Ref Range Status   07/26/2007 0.73 0.4 - 5.0 mU/L Final     Last Basic Metabolic Panel:  Lab Results   Component Value Date     05/04/2020     04/26/2013      Lab Results   Component Value Date    POTASSIUM 4.0 05/04/2020    POTASSIUM 4.4 04/26/2013     Lab Results   Component Value Date    CHLORIDE 106 05/04/2020    CHLORIDE 102 04/26/2013     Lab Results   Component Value Date    DEVIN 8.9 05/04/2020    DEVIN 8.4 04/26/2013     Lab Results   Component Value Date    CO2 23 05/04/2020    CO2 28 04/26/2013     Lab Results   Component Value Date    BUN 11 05/04/2020    BUN 8 04/26/2013     Lab Results   Component Value Date    CR 0.71 05/04/2020    CR 0.65 04/26/2013     Lab Results   Component Value Date    GLC 87 05/04/2020    GLC 79 04/26/2013     Lab Results   Component Value Date    WBC 5.9 04/22/2013     Lab Results   Component Value Date    RBC 3.22  04/22/2013     Lab Results   Component Value Date    HGB 15.1 05/04/2020    HGB 10.7 04/26/2013     Lab Results   Component Value Date    HCT 29.1 04/22/2013     Lab Results   Component Value Date    MCV 90 04/22/2013     Lab Results   Component Value Date    MCH 31.4 04/22/2013     Lab Results   Component Value Date    MCHC 34.7 04/22/2013     Lab Results   Component Value Date    RDW 12.2 04/22/2013     Lab Results   Component Value Date     04/22/2013     Thank you for allowing me to participate in the care of your patient.      Sincerely,     Benito Cuellar MD     [facility]  cc:   Benito Cuellar MD  [address]  [place] [address]

## 2024-07-08 ENCOUNTER — HOSPITAL ENCOUNTER (OUTPATIENT)
Dept: ULTRASOUND IMAGING | Facility: CLINIC | Age: 74
Discharge: HOME | End: 2024-07-08
Attending: INTERNAL MEDICINE | Admitting: INTERNAL MEDICINE
Payer: MEDICARE

## 2024-07-08 DIAGNOSIS — M79.662 PAIN IN BOTH LOWER LEGS: ICD-10-CM

## 2024-07-08 DIAGNOSIS — R09.89 OTHER SPECIFIED SYMPTOMS AND SIGNS INVOLVING THE CIRCULATORY AND RESPIRATORY SYSTEMS: ICD-10-CM

## 2024-07-08 DIAGNOSIS — M79.661 PAIN IN BOTH LOWER LEGS: ICD-10-CM

## 2024-07-08 PROCEDURE — 93924 LWR XTR VASC STDY BILAT: CPT | Mod: 26 | Performed by: INTERNAL MEDICINE

## 2024-07-08 PROCEDURE — 93924 LWR XTR VASC STDY BILAT: CPT

## 2024-08-02 ENCOUNTER — TELEPHONE (OUTPATIENT)
Dept: OPHTHALMOLOGY | Facility: CLINIC | Age: 74
End: 2024-08-02
Payer: MEDICARE

## 2024-08-02 NOTE — TELEPHONE ENCOUNTER
Health Call Center    Phone Message    May a detailed message be left on voicemail: yes     Reason for Call: Form or Letter   Type or form/letter needing completion: Eyeglasses RX   Provider: Dr. Caraballo   Date form needed: ASAP   Once completed: Fax form to: [facility] [phone]    Action Taken: Message routed to:  [facility] Eye     Travel Screening: Not Applicable     Other:  Patient states order needs to be signed by Dr. Caraballo please.  Best number to reach him with any questions is [phone].  Thank you!

## 2024-08-07 NOTE — TELEPHONE ENCOUNTER
[facility] Call Center    Phone Message    May a detailed message be left on voicemail: yes     Reason for Call: Other: Patient called to follow up on request. Please call patient when this has been done please. Thanks.      Action Taken: Other: eye    Travel Screening: Not Applicable

## 2024-10-08 ENCOUNTER — APPOINTMENT (OUTPATIENT)
Dept: MRI IMAGING | Facility: CLINIC | Age: 74
End: 2024-10-08
Attending: EMERGENCY MEDICINE
Payer: MEDICARE

## 2024-10-08 ENCOUNTER — HOSPITAL ENCOUNTER (EMERGENCY)
Facility: CLINIC | Age: 74
Discharge: HOME | End: 2024-10-08
Attending: EMERGENCY MEDICINE | Admitting: EMERGENCY MEDICINE
Payer: MEDICARE

## 2024-10-08 VITALS
SYSTOLIC BLOOD PRESSURE: 131 MMHG | OXYGEN SATURATION: 96 % | DIASTOLIC BLOOD PRESSURE: 79 MMHG | HEART RATE: 75 BPM | TEMPERATURE: 99 F | RESPIRATION RATE: 14 BRPM

## 2024-10-08 DIAGNOSIS — R26.89 IMBALANCE: ICD-10-CM

## 2024-10-08 DIAGNOSIS — N39.0 ACUTE UTI: ICD-10-CM

## 2024-10-08 LAB
ALBUMIN UR-MCNC: 70 MG/DL
ANION GAP SERPL CALCULATED.3IONS-SCNC: 14 MMOL/L (ref 7–15)
APPEARANCE UR: ABNORMAL
APTT PPP: 27 SECONDS (ref 22–38)
BACTERIA #/AREA URNS HPF: ABNORMAL /HPF
BASOPHILS # BLD AUTO: 0.1 10E3/UL (ref 0–0.2)
BASOPHILS NFR BLD AUTO: 0 %
BILIRUB UR QL STRIP: NEGATIVE
BUN SERPL-MCNC: 14.1 MG/DL (ref 8–23)
CALCIUM SERPL-MCNC: 9.1 MG/DL (ref 8.8–10.4)
CHLORIDE SERPL-SCNC: 101 MMOL/L (ref 98–107)
COLOR UR AUTO: YELLOW
CREAT SERPL-MCNC: 0.88 MG/DL (ref 0.67–1.17)
EGFRCR SERPLBLD CKD-EPI 2021: >90 ML/MIN/1.73M2
EOSINOPHIL # BLD AUTO: 0 10E3/UL (ref 0–0.7)
EOSINOPHIL NFR BLD AUTO: 0 %
ERYTHROCYTE [DISTWIDTH] IN BLOOD BY AUTOMATED COUNT: 12.5 % (ref 10–15)
GLUCOSE BLDC GLUCOMTR-MCNC: 106 MG/DL (ref 70–99)
GLUCOSE SERPL-MCNC: 92 MG/DL (ref 70–99)
GLUCOSE UR STRIP-MCNC: NEGATIVE MG/DL
HCO3 SERPL-SCNC: 20 MMOL/L (ref 22–29)
HCT VFR BLD AUTO: 44.9 % (ref 40–53)
HGB BLD-MCNC: 15.1 G/DL (ref 13.3–17.7)
HGB UR QL STRIP: ABNORMAL
IMM GRANULOCYTES # BLD: 0.1 10E3/UL
IMM GRANULOCYTES NFR BLD: 0 %
INR PPP: 1.08 (ref 0.85–1.15)
KETONES UR STRIP-MCNC: 60 MG/DL
LEUKOCYTE ESTERASE UR QL STRIP: ABNORMAL
LYMPHOCYTES # BLD AUTO: 1.6 10E3/UL (ref 0.8–5.3)
LYMPHOCYTES NFR BLD AUTO: 10 %
MCH RBC QN AUTO: 30.9 PG (ref 26.5–33)
MCHC RBC AUTO-ENTMCNC: 33.6 G/DL (ref 31.5–36.5)
MCV RBC AUTO: 92 FL (ref 78–100)
MONOCYTES # BLD AUTO: 1.5 10E3/UL (ref 0–1.3)
MONOCYTES NFR BLD AUTO: 9 %
MUCOUS THREADS #/AREA URNS LPF: PRESENT /LPF
NEUTROPHILS # BLD AUTO: 12.6 10E3/UL (ref 1.6–8.3)
NEUTROPHILS NFR BLD AUTO: 79 %
NITRATE UR QL: NEGATIVE
NRBC # BLD AUTO: 0 10E3/UL
NRBC BLD AUTO-RTO: 0 /100
PH UR STRIP: 5.5 [PH] (ref 5–7)
PLATELET # BLD AUTO: 177 10E3/UL (ref 150–450)
POTASSIUM SERPL-SCNC: 4.3 MMOL/L (ref 3.4–5.3)
RBC # BLD AUTO: 4.89 10E6/UL (ref 4.4–5.9)
RBC URINE: 125 /HPF
SODIUM SERPL-SCNC: 135 MMOL/L (ref 135–145)
SP GR UR STRIP: 1.02 (ref 1–1.03)
SQUAMOUS EPITHELIAL: 2 /HPF
TROPONIN T SERPL HS-MCNC: 10 NG/L
UROBILINOGEN UR STRIP-MCNC: NORMAL MG/DL
WBC # BLD AUTO: 15.9 10E3/UL (ref 4–11)
WBC CLUMPS #/AREA URNS HPF: PRESENT /HPF
WBC URINE: >182 /HPF

## 2024-10-08 PROCEDURE — 99285 EMERGENCY DEPT VISIT HI MDM: CPT | Performed by: EMERGENCY MEDICINE

## 2024-10-08 PROCEDURE — 87186 SC STD MICRODIL/AGAR DIL: CPT | Performed by: EMERGENCY MEDICINE

## 2024-10-08 PROCEDURE — 70549 MR ANGIOGRAPH NECK W/O&W/DYE: CPT | Mod: MA

## 2024-10-08 PROCEDURE — 82962 GLUCOSE BLOOD TEST: CPT

## 2024-10-08 PROCEDURE — 36415 COLL VENOUS BLD VENIPUNCTURE: CPT | Performed by: EMERGENCY MEDICINE

## 2024-10-08 PROCEDURE — 70549 MR ANGIOGRAPH NECK W/O&W/DYE: CPT | Mod: 26 | Performed by: RADIOLOGY

## 2024-10-08 PROCEDURE — 87040 BLOOD CULTURE FOR BACTERIA: CPT | Performed by: EMERGENCY MEDICINE

## 2024-10-08 PROCEDURE — 81001 URINALYSIS AUTO W/SCOPE: CPT | Performed by: EMERGENCY MEDICINE

## 2024-10-08 PROCEDURE — 80048 BASIC METABOLIC PNL TOTAL CA: CPT | Performed by: EMERGENCY MEDICINE

## 2024-10-08 PROCEDURE — 99285 EMERGENCY DEPT VISIT HI MDM: CPT | Mod: 25 | Performed by: EMERGENCY MEDICINE

## 2024-10-08 PROCEDURE — 84484 ASSAY OF TROPONIN QUANT: CPT | Performed by: EMERGENCY MEDICINE

## 2024-10-08 PROCEDURE — 93010 ELECTROCARDIOGRAM REPORT: CPT | Performed by: EMERGENCY MEDICINE

## 2024-10-08 PROCEDURE — 96365 THER/PROPH/DIAG IV INF INIT: CPT | Mod: 59 | Performed by: EMERGENCY MEDICINE

## 2024-10-08 PROCEDURE — 255N000002 HC RX 255 OP 636: Performed by: EMERGENCY MEDICINE

## 2024-10-08 PROCEDURE — 70553 MRI BRAIN STEM W/O & W/DYE: CPT | Mod: 26 | Performed by: RADIOLOGY

## 2024-10-08 PROCEDURE — 85730 THROMBOPLASTIN TIME PARTIAL: CPT | Performed by: EMERGENCY MEDICINE

## 2024-10-08 PROCEDURE — 85025 COMPLETE CBC W/AUTO DIFF WBC: CPT | Performed by: EMERGENCY MEDICINE

## 2024-10-08 PROCEDURE — 85610 PROTHROMBIN TIME: CPT | Performed by: EMERGENCY MEDICINE

## 2024-10-08 PROCEDURE — 70544 MR ANGIOGRAPHY HEAD W/O DYE: CPT | Mod: MA

## 2024-10-08 PROCEDURE — 70553 MRI BRAIN STEM W/O & W/DYE: CPT | Mod: MA

## 2024-10-08 PROCEDURE — 93005 ELECTROCARDIOGRAM TRACING: CPT | Performed by: EMERGENCY MEDICINE

## 2024-10-08 PROCEDURE — 250N000011 HC RX IP 250 OP 636: Performed by: EMERGENCY MEDICINE

## 2024-10-08 PROCEDURE — A9585 GADOBUTROL INJECTION: HCPCS | Performed by: EMERGENCY MEDICINE

## 2024-10-08 RX ORDER — CEFTRIAXONE 1 G/1
1 INJECTION, POWDER, FOR SOLUTION INTRAMUSCULAR; INTRAVENOUS ONCE
Status: COMPLETED | OUTPATIENT
Start: 2024-10-08 | End: 2024-10-08

## 2024-10-08 RX ORDER — GADOBUTROL 604.72 MG/ML
8 INJECTION INTRAVENOUS ONCE
Status: COMPLETED | OUTPATIENT
Start: 2024-10-08 | End: 2024-10-08

## 2024-10-08 RX ORDER — CEPHALEXIN 500 MG/1
500 CAPSULE ORAL 4 TIMES DAILY
Qty: 28 CAPSULE | Refills: 0 | Status: SHIPPED | OUTPATIENT
Start: 2024-10-08 | End: 2024-10-15

## 2024-10-08 RX ADMIN — CEFTRIAXONE SODIUM 1 G: 1 INJECTION, POWDER, FOR SOLUTION INTRAMUSCULAR; INTRAVENOUS at 18:30

## 2024-10-08 RX ADMIN — GADOBUTROL 8 ML: 604.72 INJECTION INTRAVENOUS at 17:46

## 2024-10-08 ASSESSMENT — COLUMBIA-SUICIDE SEVERITY RATING SCALE - C-SSRS
2. HAVE YOU ACTUALLY HAD ANY THOUGHTS OF KILLING YOURSELF IN THE PAST MONTH?: NO
1. IN THE PAST MONTH, HAVE YOU WISHED YOU WERE DEAD OR WISHED YOU COULD GO TO SLEEP AND NOT WAKE UP?: NO
6. HAVE YOU EVER DONE ANYTHING, STARTED TO DO ANYTHING, OR PREPARED TO DO ANYTHING TO END YOUR LIFE?: NO

## 2024-10-08 ASSESSMENT — ACTIVITIES OF DAILY LIVING (ADL)
ADLS_ACUITY_SCORE: 35

## 2024-10-08 NOTE — DISCHARGE INSTRUCTIONS
TODAY'S VISIT:  You were seen today for balance problem   -   - If you had any labs or imaging/radiology tests performed today, you should also discuss these tests with your usual provider.     FOLLOW-UP:  Please make an appointment to follow up with:  - Your Primary Care Provider. If you do not have a PCP, please call the Primary Care Center (phone: [phone] for an appointment    - Have your provider review the results from today's visit with you again to make sure no further follow-up or additional testing is needed based on those results.     RETURN TO THE EMERGENCY DEPARTMENT  Return to the Emergency Department at any time for any new or worsening symptoms or any concerns.
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
## 2024-10-08 NOTE — ED NOTES
Emergency Department Patient Sign-out       Brief HPI:  This is a 74 year old male signed out to me by Dr. Matson .  See initial ED Provider note for details of the presentation.            Significant Events prior to my assuming care: The patient is a 74-year-old male who presents to the emergency department with dizziness and unsteadiness.  The patient notes he primarily seems to have difficulty while driving his vehicle, he feels as if he is veering to the right and he has had difficulty controlling it.  He also feels he has some difficulty with balance while ambulating, but this was not appreciated on exam in the emergency department.  Patient has been seen by stroke neurology and is awaiting MRI for further evaluation.  Patient will need to be discussed with stroke neurology after MRI is resulted to determine final disposition and plan patient did also have elevated white blood cell count, urinalysis is pending to evaluate for UTI.  Patient does not have any focal infectious symptoms.      Exam:   Patient Vitals for the past 24 hrs:   BP Temp Pulse Resp SpO2   10/08/24 1409 (!) 140/84 -- -- -- --   10/08/24 1406 -- 97.7  F (36.5  C) 77 14 96 %           ED RESULTS:   Results for orders placed or performed during the hospital encounter of 10/08/24 (from the past 24 hour(s))   Glucose by meter     Status: Abnormal    Collection Time: 10/08/24  2:05 PM   Result Value Ref Range    GLUCOSE BY METER POCT 106 (H) 70 - 99 mg/dL   CBC with Platelets & Differential     Status: Abnormal    Collection Time: 10/08/24  2:28 PM    Narrative    The following orders were created for panel order CBC with Platelets & Differential.  Procedure                               Abnormality         Status                     ---------                               -----------         ------                     CBC with platelets and d...[[id number]]  Abnormal            Final result                 Please view results for these tests  on the individual orders.   Basic metabolic panel     Status: Abnormal    Collection Time: 10/08/24  2:28 PM   Result Value Ref Range    Sodium 135 135 - 145 mmol/L    Potassium 4.3 3.4 - 5.3 mmol/L    Chloride 101 98 - 107 mmol/L    Carbon Dioxide (CO2) 20 (L) 22 - 29 mmol/L    Anion Gap 14 7 - 15 mmol/L    Urea Nitrogen 14.1 8.0 - 23.0 mg/dL    Creatinine 0.88 0.67 - 1.17 mg/dL    GFR Estimate >90 >60 mL/min/1.73m2    Calcium 9.1 8.8 - 10.4 mg/dL    Glucose 92 70 - 99 mg/dL   INR     Status: Normal    Collection Time: 10/08/24  2:28 PM   Result Value Ref Range    INR 1.08 0.85 - 1.15   Partial thromboplastin time     Status: Normal    Collection Time: 10/08/24  2:28 PM   Result Value Ref Range    aPTT 27 22 - 38 Seconds   Troponin T, High Sensitivity     Status: Normal    Collection Time: 10/08/24  2:28 PM   Result Value Ref Range    Troponin T, High Sensitivity 10 <=22 ng/L   CBC with platelets and differential     Status: Abnormal    Collection Time: 10/08/24  2:28 PM   Result Value Ref Range    WBC Count 15.9 (H) 4.0 - 11.0 10e3/uL    RBC Count 4.89 4.40 - 5.90 10e6/uL    Hemoglobin 15.1 13.3 - 17.7 g/dL    Hematocrit 44.9 40.0 - 53.0 %    MCV 92 78 - 100 fL    MCH 30.9 26.5 - 33.0 pg    MCHC 33.6 31.5 - 36.5 g/dL    RDW 12.5 10.0 - 15.0 %    Platelet Count 177 150 - 450 10e3/uL    % Neutrophils 79 %    % Lymphocytes 10 %    % Monocytes 9 %    % Eosinophils 0 %    % Basophils 0 %    % Immature Granulocytes 0 %    NRBCs per 100 WBC 0 <1 /100    Absolute Neutrophils 12.6 (H) 1.6 - 8.3 10e3/uL    Absolute Lymphocytes 1.6 0.8 - 5.3 10e3/uL    Absolute Monocytes 1.5 (H) 0.0 - 1.3 10e3/uL    Absolute Eosinophils 0.0 0.0 - 0.7 10e3/uL    Absolute Basophils 0.1 0.0 - 0.2 10e3/uL    Absolute Immature Granulocytes 0.1 <=0.4 10e3/uL    Absolute NRBCs 0.0 10e3/uL   UA with Microscopic reflex to Culture     Status: Abnormal    Collection Time: 10/08/24  4:52 PM    Specimen: Urine, Midstream   Result Value Ref Range    Color  Urine Yellow Colorless, Straw, Light Yellow, Yellow    Appearance Urine Cloudy (A) Clear    Glucose Urine Negative Negative mg/dL    Bilirubin Urine Negative Negative    Ketones Urine 60 (A) Negative mg/dL    Specific Gravity Urine 1.023 1.003 - 1.035    Blood Urine Large (A) Negative    pH Urine 5.5 5.0 - 7.0    Protein Albumin Urine 70 (A) Negative mg/dL    Urobilinogen Urine Normal Normal, 2.0 mg/dL    Nitrite Urine Negative Negative    Leukocyte Esterase Urine Large (A) Negative    Bacteria Urine Few (A) None Seen /HPF    WBC Clumps Urine Present (A) None Seen /HPF    Mucus Urine Present (A) None Seen /LPF    RBC Urine 125 (H) <=2 /HPF    WBC Urine >182 (H) <=5 /HPF    Squamous Epithelials Urine 2 (H) <=1 /HPF    Narrative    Urine Culture ordered based on laboratory criteria       ED MEDICATIONS:   Medications   cefTRIAXone (ROCEPHIN) 1 g vial to attach to  mL bag for ADULTS or NS 50 mL bag for PEDS (has no administration in time range)   gadobutrol (GADAVIST) injection 8 mL (8 mLs Intravenous $Given 10/8/24 4663)         Impression:    ICD-10-CM    1. Imbalance  R26.89       2. Acute UTI  N39.0           Plan:    Pending studies include MRI and final neurology recommendations.    Urinalysis does appear consistent with UTI.  Rocephin was given and will plan to treat with Keflex if patient is discharged home.    MRI shows no acute process.    Patient was discussed with neurology, they recommend discharge home with outpatient neurology follow-up    Patient to be discharged home. Advised to follow up with PCP within 1 week and outpatient neurology follow-up. To return to ER immediately with any new/worsening symptoms. Plan of care discussed with patient who expresses understanding and agrees with plan of care.    MD Jerilyn Waite Michelle C, MD  10/1950

## 2024-10-08 NOTE — ED TRIAGE NOTES
Pt arrives ambulatory to ED c/o new onset of dizziness and loss of balance starting Sunday late afternoon. Pt describes driving while having lapses in time and drifting to the right.     Triage Assessment (Adult)       Row Name 10/08/24 1401          Triage Assessment    Airway WDL WDL        Respiratory WDL    Respiratory WDL WDL        Skin Circulation/Temperature WDL    Skin Circulation/Temperature WDL WDL        Peripheral/Neurovascular WDL    Peripheral Neurovascular WDL WDL        Cognitive/Neuro/Behavioral WDL    Cognitive/Neuro/Behavioral WDL WDL

## 2024-10-08 NOTE — CONSULTS
[facility]    Stroke Consult Note    Reason for Consult: Dizziness and imbalance    Chief Complaint: Stroke Symptoms and Dizziness       HPI  Karri Brady is a 74 year old male with history of hyperlipidemia, osteoarthritis but no other known vascular risk factors presents to the ED for evaluation of dizziness and loss of balance.  Stroke neurology was called for further evaluation.    Patient reported that on Sunday, he felt off balance and had mild lightheadedness while he was walking at home.  He has been having this sensation on and off today as well but it slightly better .Yesterday, when he was driving he felt that he had lapses in time (few seconds) as he was at the end of the driveway without realizing.  These episodes happened at least twice yesterday while he was driving.  He reported that he was drifting to the right side to the charge nurse but denied that to me.  He mentioned that he had COVID a month back and is feeling felt similar to him having this COVID infection.  But he did not have any recent constitutional symptoms including fever, fatigue or lethargy. He denied any other symptoms including vision changes, speech difficulties, numbness, paresthesias, weakness etc. He did not have any falls.    He does not have any prior history of seizures or strokes.  Denied head trauma, CNS infections, febrile seizures, family history of seizures.  He did report that his father had stroke.    He denied known diagnosis of hypertension, diabetes, sleep apnea.  He does not smoke.  He takes atorvastatin 40 mg and some painkillers including gabapentin and meloxicam for his pain.        Impression    #Dizziness and imbalance  Karri Brady is a 74 year old male with history of hyperlipidemia, osteoarthritis but no other known vascular risk factors presents to the ED for evaluation of dizziness and loss of balance.  Stroke neurology was called for further  evaluation.    Patient has been having feeling of lightheadedness and imbalance since 2 days.  He also had 2 episodes of time lapse a few seconds.  No known risk factors for the seizures. His neurologic exam is relatively unremarkable except for mild drift in left lower extremities but he also has arthritic pain in his left knee.  He was able to walk on tiptoe and heel without difficulty.  The suspicion for this stroke is low.  However, we recommended MRI brain with and without contrast and MRA head and neck for further evaluation which were unremarkable for acute. Patient is found to have UTI and probably that may explain some of the symptoms.  However, we recommend outpatient neurology follow-up for further evaluation and consideration of EEG for focal seizures if he continues to have similar spells of time lapses.     Recommendations     -MRI brain with and without contrast- resulted as above  -MRA head and neck with and without contrast-resulted as above  -Outpatient neurology follow up    Patient Follow-up      Thank you for this consult. No further stroke evaluation is recommended, so we will sign off. Please contact us with any additional questions.    The patient was discussed with the Stroke Staff  Dr. Espinoza.    Phu Alvarado MD  Neurology Resident  Pager:  [phone]  _____________________________________________________    Clinically Significant Risk Factors Present on Admission                                        Past Medical History    Past Medical History:   Diagnosis Date    Hyperlipidemia     Impaired glucose tolerance     Injury, other and unspecified, unspecified site 1998    Fracture left distal radius,Achilles tendon rupture.    Osteoarthritis     Overweight(278.02)     Strabismus     Thyroid nodule     Trigger finger     mutliple operations for this     Medications   Home Meds  Prior to Admission medications    Medication Sig Start Date End Date Taking? Authorizing Provider   atorvastatin (LIPITOR)  40 MG tablet Take 80 mg by mouth daily    Reported, Patient   fluconazole (DIFLUCAN) 150 MG tablet Take 1 tablet (150 mg) by mouth daily 8/6/21   Wegener, Joel Daniel Irwin, MD   gabapentin (NEURONTIN) 300 MG capsule 600 mg Taking 600 mg daily    Reported, Patient   meloxicam (MOBIC) 7.5 MG tablet as needed 11/26/19   Reported, Patient       Scheduled Meds  No current facility-administered medications for this encounter.       Infusion Meds  No current facility-administered medications for this encounter.       Allergies   Allergies   Allergen Reactions    Bee Venom Swelling    No Known Drug Allergy           PHYSICAL EXAMINATION   Temp:  [97.7  F (36.5  C)] 97.7  F (36.5  C)  Pulse:  [77] 77  Resp:  [14] 14  BP: (140)/(84) 140/84  SpO2:  [96 %] 96 %    General Exam  General:  patient lying in bed without any acute distress    HEENT:  normocephalic/atraumatic  Cardio:  RRR  Pulmonary:  no respiratory distress  Abdomen:  soft  Extremities:  no edema  Skin:  intact     Neuro Exam  Mental Status:  alert, oriented x 3, follows commands, speech clear and fluent, naming and repetition normal  Cranial Nerves:  visual fields intact, PERRL, EOMI with normal smooth pursuit, facial sensation intact and symmetric, facial movements symmetric, hearing not formally tested but intact to conversation, palate elevation symmetric and uvula midline, no dysarthria, shoulder shrug strong bilaterally, tongue protrusion midline  Motor:  normal muscle tone and bulk, no abnormal movements, able to move all limbs spontaneously, strength 5/5 throughout upper and lower extremities, no pronator drift  Reflexes:  toes down-going  Sensory:  light touch sensation intact and symmetric throughout upper and lower extremities, no extinction on double simultaneous stimulation   Coordination:  normal finger-to-nose and heel-to-shin bilaterally without dysmetria, rapid alternating movements symmetric ,Romberg's was negative  Station/Gait: Normal casual  "gait, able to walk on tiptoe and heel without difficulty    Stroke Scales    NIHSS  1a. Level of Consciousness 0-->Alert, keenly responsive   1b. LOC Questions 0-->Answers both questions correctly   1c. LOC Commands 0-->Performs both tasks correctly   2.   Best Gaze 0-->Normal   3.   Visual 0-->No visual loss   4.   Facial Palsy 0-->Normal symmetrical movements   5a. Motor Arm, Left 0-->No drift, limb holds 90 (or 45) degrees for full 10 secs   5b. Motor Arm, Right 0-->No drift, limb holds 90 (or 45) degrees for full 10 secs   6a. Motor Leg, Left 1--> mild drift, limited by osteoarthritic pain    6b. Motor Leg, right 0-->No drift, leg holds 30 degree position for full 5 secs   7.   Limb Ataxia 0-->Absent   8.   Sensory 0-->Normal, no sensory loss   9.   Best Language 0-->No aphasia, normal   10. Dysarthria 0-->Normal   11. Extinction and Inattention  0-->No abnormality   Total 1 (10/08/24 1455)       Imaging  I personally reviewed all imaging; relevant findings per HPI.    Labs Data   CBC  Recent Labs   Lab 10/08/24  1428   WBC 15.9*   RBC 4.89   HGB 15.1   HCT 44.9        Basic Metabolic Panel   Recent Labs   Lab 10/08/24  1405   *     Liver Panel  No results for input(s): \"PROTTOTAL\", \"ALBUMIN\", \"BILITOTAL\", \"ALKPHOS\", \"AST\", \"ALT\", \"BILIDIRECT\" in the last 168 hours.  INR  No lab results found.        Stroke Consult Data Data   This was a non-emergent, non-telestroke consult.  "

## 2024-10-10 LAB
ATRIAL RATE - MUSE: 79 BPM
DIASTOLIC BLOOD PRESSURE - MUSE: NORMAL MMHG
INTERPRETATION ECG - MUSE: NORMAL
P AXIS - MUSE: 41 DEGREES
PR INTERVAL - MUSE: 154 MS
QRS DURATION - MUSE: 76 MS
QT - MUSE: 358 MS
QTC - MUSE: 410 MS
R AXIS - MUSE: -6 DEGREES
SYSTOLIC BLOOD PRESSURE - MUSE: NORMAL MMHG
T AXIS - MUSE: 25 DEGREES
VENTRICULAR RATE- MUSE: 79 BPM

## 2024-10-11 ENCOUNTER — TELEPHONE (OUTPATIENT)
Dept: NURSING | Facility: CLINIC | Age: 74
End: 2024-10-11
Payer: MEDICARE

## 2024-10-11 LAB
BACTERIA UR CULT: ABNORMAL
BACTERIA UR CULT: ABNORMAL

## 2024-10-11 NOTE — TELEPHONE ENCOUNTER
[facility] ([place])    Reason for call: Lab Result Notification     Lab Result (including Rx patient on, if applicable).  If culture, copy of lab report at bottom.  Lab Result: urine culture - see below    ED Rx: cephALEXin (KEFLEX) 500 MG capsule - Take 1 capsule (500 mg) by mouth 4 times daily for 7 days (susceptible)    Creatinine Level (mg/dl)   Creatinine   Date Value Ref Range Status   10/08/2024 0.88 0.67 - 1.17 mg/dL Final   04/26/2013 0.65 (L) 0.66 - 1.25 mg/dL Final    Creatinine clearance (ml/min), if applicable    Serum creatinine: 0.88 mg/dL 10/08/24 1428  Estimated creatinine clearance: 73.5 mL/min     Patient presented to [facility] ED on 10/8/2024 with 2 days of feeling off balance and having difficulty driving    RN Recommendations/Instructions per [facility] lab result protocol:   [facility] ED lab result protocol utilized: urine culture protocol      Patient's current Symptoms:   Spoke to patient and reviewed urine culture results with them. Patient states symptoms have improved overall, but he is still experiencing some discomfort with urination. Advised patient continue taking cephalexin as prescribed until gone. Return precautions reviewed. Patient verbalized understanding and agreement.    Patient/care giver notified to contact your PCP clinic or return to the Emergency department if your:  Symptoms return.  Symptoms do not improve after 3 days on antibiotic.  Symptoms do not resolve after completing antibiotic.  Symptoms worsen or other concerning symptoms.           Jennifer Tobar RN

## 2024-10-13 LAB — BACTERIA BLD CULT: NO GROWTH

## 2024-10-24 ENCOUNTER — OFFICE VISIT (OUTPATIENT)
Dept: OPHTHALMOLOGY | Facility: CLINIC | Age: 74
End: 2024-10-24
Attending: OPHTHALMOLOGY
Payer: MEDICARE

## 2024-10-24 DIAGNOSIS — H40.003 GLAUCOMA SUSPECT OF BOTH EYES: Primary | ICD-10-CM

## 2024-10-24 PROCEDURE — 92133 CPTRZD OPH DX IMG PST SGM ON: CPT | Performed by: OPHTHALMOLOGY

## 2024-10-24 PROCEDURE — 99213 OFFICE O/P EST LOW 20 MIN: CPT | Performed by: OPHTHALMOLOGY

## 2024-10-24 PROCEDURE — G0463 HOSPITAL OUTPT CLINIC VISIT: HCPCS | Performed by: OPHTHALMOLOGY

## 2024-10-24 PROCEDURE — 92083 EXTENDED VISUAL FIELD XM: CPT | Performed by: OPHTHALMOLOGY

## 2024-10-24 ASSESSMENT — VISUAL ACUITY
OD_CC: 20/20
CORRECTION_TYPE: GLASSES
METHOD: SNELLEN - LINEAR
OS_CC: 20/20
OD_CC+: -2

## 2024-10-24 ASSESSMENT — REFRACTION_WEARINGRX
OD_ADD: +2.50
OS_ADD: +2.50
OS_CYLINDER: +2.00
OS_SPHERE: -5.25
OD_CYLINDER: +1.25
OD_SPHERE: -3.25
OS_AXIS: 005
OD_AXIS: 155

## 2024-10-24 ASSESSMENT — EXTERNAL EXAM - RIGHT EYE: OD_EXAM: NORMAL

## 2024-10-24 ASSESSMENT — TONOMETRY
OS_IOP_MMHG: 19
OD_IOP_MMHG: 15
IOP_METHOD: TONOPEN

## 2024-10-24 ASSESSMENT — SLIT LAMP EXAM - LIDS
COMMENTS: MGD, BLEPHARITIS
COMMENTS: MGD, BLEPHARITIS

## 2024-10-24 ASSESSMENT — CUP TO DISC RATIO
OD_RATIO: 0.05
OS_RATIO: 0.2

## 2024-10-24 ASSESSMENT — CONF VISUAL FIELD: COMMENTS: VF TODAY

## 2024-10-24 ASSESSMENT — EXTERNAL EXAM - LEFT EYE: OS_EXAM: NORMAL

## 2024-10-24 NOTE — NURSING NOTE
Chief Complaints and History of Present Illnesses   Patient presents with    Follow Up     Glaucoma suspect of both eyes      Chief Complaint(s) and History of Present Illness(es)       Follow Up              Comments: Glaucoma suspect of both eyes               Comments    Pt states no change in VA since last visit  States no flashes or floaters   No eye pain or redness  Using: Xiidra BID each eye    Lidia Caraballo COT 1:16 PM October 24, 2024

## 2024-10-24 NOTE — PROGRESS NOTES
HPI       Follow Up     Additional comments: Glaucoma suspect of both eyes              Comments    Pt states no change in VA since last visit  States no flashes or floaters   No eye pain or redness  Using: Xiidra BID each eye    Lidia Caraballo COT 1:16 PM October 24, 2024               Last edited by Lidia Caraballo on 10/24/2024  1:16 PM.          Review of systems for the eyes was negative other than the pertinent positives/negatives listed in the HPI.      Assessment & Plan      Karri Brady is a 74 year old male with the following diagnoses:   1. Glaucoma suspect of both eyes         Here for glaucoma suspect eval  Intraocular pressure remains within normal limits both eyes   OCT Nerve fiber layer within normal limits both eyes   Visual field with nonspecific change left eye > right eye   Low risk   Annual screening recommended     Early cataract left eye > right eye   Not visually significant   Continue xiidra twice a day both eyes   Artificial tears and warm compresses as needed       Patient disposition:   Return in about 1 year (around 10/24/2025) for DFE, OCT NFL, 24-2 Dynamic VF.           Attending Physician Attestation:  Complete documentation of historical and exam elements from today's encounter can be found in the full encounter summary report (not reduplicated in this progress note).  I personally obtained the chief complaint(s) and history of present illness.  I confirmed and edited as necessary the review of systems, past medical/surgical history, family history, social history, and examination findings as documented by others; and I examined the patient myself.  I personally reviewed the relevant tests, images, and reports as documented above.  I formulated and edited as necessary the assessment and plan and discussed the findings and management plan with the patient and family. . - Eliceer Caraballo MD

## 2025-05-22 ENCOUNTER — LAB REQUISITION (OUTPATIENT)
Dept: LAB | Facility: CLINIC | Age: 75
End: 2025-05-22
Payer: MEDICARE

## 2025-05-22 DIAGNOSIS — R41.3 OTHER AMNESIA: ICD-10-CM

## 2025-05-23 ENCOUNTER — TRANSFERRED RECORDS (OUTPATIENT)
Dept: HEALTH INFORMATION MANAGEMENT | Facility: CLINIC | Age: 75
End: 2025-05-23
Payer: MEDICARE

## 2025-05-27 LAB
ANION GAP SERPL CALCULATED.3IONS-SCNC: 16 MMOL/L (ref 7–15)
BUN SERPL-MCNC: 12.6 MG/DL (ref 8–23)
CALCIUM SERPL-MCNC: 9.6 MG/DL (ref 8.8–10.4)
CHLORIDE SERPL-SCNC: 104 MMOL/L (ref 98–107)
CREAT SERPL-MCNC: 0.8 MG/DL (ref 0.67–1.17)
EGFRCR SERPLBLD CKD-EPI 2021: >90 ML/MIN/1.73M2
FOLATE SERPL-MCNC: 13.3 NG/ML (ref 4.6–34.8)
GLUCOSE SERPL-MCNC: 83 MG/DL (ref 70–99)
HCO3 SERPL-SCNC: 20 MMOL/L (ref 22–29)
HGB BLD-MCNC: 13.8 G/DL (ref 13.3–17.7)
MCV RBC AUTO: 92 FL (ref 78–100)
POTASSIUM SERPL-SCNC: 4.1 MMOL/L (ref 3.4–5.3)
SODIUM SERPL-SCNC: 140 MMOL/L (ref 135–145)
TSH SERPL DL<=0.005 MIU/L-ACNC: 1.56 UIU/ML (ref 0.3–4.2)
VIT B12 SERPL-MCNC: 564 PG/ML (ref 232–1245)

## 2025-05-27 PROCEDURE — 80048 BASIC METABOLIC PNL TOTAL CA: CPT | Performed by: INTERNAL MEDICINE

## 2025-05-27 PROCEDURE — P9604 ONE-WAY ALLOW PRORATED TRIP: HCPCS | Performed by: INTERNAL MEDICINE

## 2025-05-27 PROCEDURE — 85018 HEMOGLOBIN: CPT | Performed by: INTERNAL MEDICINE

## 2025-05-27 PROCEDURE — 36415 COLL VENOUS BLD VENIPUNCTURE: CPT | Performed by: INTERNAL MEDICINE

## 2025-05-27 PROCEDURE — 82607 VITAMIN B-12: CPT | Performed by: INTERNAL MEDICINE

## 2025-05-27 PROCEDURE — 84443 ASSAY THYROID STIM HORMONE: CPT | Performed by: INTERNAL MEDICINE

## 2025-05-27 PROCEDURE — 82746 ASSAY OF FOLIC ACID SERUM: CPT | Performed by: INTERNAL MEDICINE

## 2025-05-28 ENCOUNTER — MEDICAL CORRESPONDENCE (OUTPATIENT)
Dept: HEALTH INFORMATION MANAGEMENT | Facility: CLINIC | Age: 75
End: 2025-05-28
Payer: MEDICARE

## 2025-05-29 ENCOUNTER — TELEPHONE (OUTPATIENT)
Dept: OTHER | Facility: CLINIC | Age: 75
End: 2025-05-29
Payer: MEDICARE

## 2025-05-29 DIAGNOSIS — I82.409 DVT (DEEP VENOUS THROMBOSIS) (H): Primary | ICD-10-CM

## 2025-05-29 NOTE — TELEPHONE ENCOUNTER
[facility]    Who is the name of the provider? Self Referral for Blood Clots     What is the location you see this provider at/preferred location? [place]    Person calling / Facility: Karri    Phone number: [phone]    Nurse call back needed:     Reason for call:  Pt is a previous patient of Dr Pan. Pt had pain in his lower L leg about 2 years ago.   1 yr later they found calcification  in the artery.  Pt now has 2 blood clots below his left knee. Pt had a L knee replacement about 2 months ago.  2 weeks ago he fell and got a slight fracture on his L left leg. He was just released from TCU today.       Pharmacy location: N/A    Outside Imaging: N/A    Can we leave a detailed message on this number? Y    Additional Info:

## 2025-05-29 NOTE — TELEPHONE ENCOUNTER
Referral received via Phone on 05/29/2025.    Self referred for blood clots, on eliquis.    Previous imaging completed (pertinent to referral):  See EPIC    Routing to scheduling to coordinate the following:  D dimer  NEW VASCULAR PATIENT consult with Vascular Medicine  Please schedule this at next available      Appt note:  Self referred for blood clots, on eliquis.

## 2025-06-02 ENCOUNTER — TELEPHONE (OUTPATIENT)
Dept: INTERNAL MEDICINE | Facility: CLINIC | Age: 75
End: 2025-06-02
Payer: MEDICARE

## 2025-06-02 NOTE — TELEPHONE ENCOUNTER
This will need to wait as I have not officially seen the patient yet.    Reinier Aleman MD  General Internal Medicine  [facility]  6/2/2025, 2:10 PM

## 2025-06-02 NOTE — TELEPHONE ENCOUNTER
Home Care is calling regarding an established patient with [facility].  Requesting orders from: Dr. Matta  PROVIDER AUTHORIZATION REQUIRED: RN unable to provide verbal approval for all orders.  See below for additional information.  RN will contact Home care with information after provider review.  Is this a request for a temporary pause in the home care episode?  No    Orders Requested    Physical Therapy  Request for initial certification (first set of orders)   Frequency: 1x week for 4 weeks  RN gave verbal order: No: Need provider approval        PATIENT IS SCHEDULED TO SEE DR MATTA TOMORROW FOR 1ST VISIT.  Contacts       Contact Date/Time Type Contact Phone/Fax    06/02/2025 01:57 PM CDT Phone (Incoming) JOLY[facility] (Home Care) [phone]          Deep Powell RN

## 2025-06-03 ENCOUNTER — TELEPHONE (OUTPATIENT)
Dept: INTERNAL MEDICINE | Facility: CLINIC | Age: 75
End: 2025-06-03

## 2025-06-03 ENCOUNTER — RESULTS FOLLOW-UP (OUTPATIENT)
Dept: OTHER | Facility: CLINIC | Age: 75
End: 2025-06-03

## 2025-06-03 ENCOUNTER — OFFICE VISIT (OUTPATIENT)
Dept: INTERNAL MEDICINE | Facility: CLINIC | Age: 75
End: 2025-06-03
Payer: MEDICARE

## 2025-06-03 VITALS
OXYGEN SATURATION: 98 % | RESPIRATION RATE: 16 BRPM | TEMPERATURE: 97.1 F | HEART RATE: 56 BPM | SYSTOLIC BLOOD PRESSURE: 128 MMHG | BODY MASS INDEX: 27.03 KG/M2 | WEIGHT: 168.2 LBS | DIASTOLIC BLOOD PRESSURE: 76 MMHG | HEIGHT: 66 IN

## 2025-06-03 DIAGNOSIS — Z87.891 FORMER SMOKER: ICD-10-CM

## 2025-06-03 DIAGNOSIS — E78.2 MIXED HYPERLIPIDEMIA: ICD-10-CM

## 2025-06-03 DIAGNOSIS — I82.462 DEEP VEIN THROMBOSIS (DVT) OF CALF MUSCLE VEIN OF LEFT LOWER EXTREMITY, UNSPECIFIED CHRONICITY (H): ICD-10-CM

## 2025-06-03 DIAGNOSIS — Z76.89 ENCOUNTER TO ESTABLISH CARE WITH NEW DOCTOR: ICD-10-CM

## 2025-06-03 DIAGNOSIS — S72.422A: Primary | ICD-10-CM

## 2025-06-03 DIAGNOSIS — M79.662 PAIN OF LEFT CALF: ICD-10-CM

## 2025-06-03 DIAGNOSIS — R52 PAIN MANAGEMENT: ICD-10-CM

## 2025-06-03 PROBLEM — M15.0 PRIMARY OSTEOARTHRITIS INVOLVING MULTIPLE JOINTS: Status: ACTIVE | Noted: 2025-05-20

## 2025-06-03 PROBLEM — Z96.652 HISTORY OF TOTAL KNEE ARTHROPLASTY, LEFT: Status: ACTIVE | Noted: 2025-05-20

## 2025-06-03 PROBLEM — J30.9 ALLERGIC RHINITIS: Status: ACTIVE | Noted: 2025-06-03

## 2025-06-03 PROBLEM — Z91.030 ALLERGIC TO BEES: Status: ACTIVE | Noted: 2025-06-03

## 2025-06-03 LAB — D DIMER PPP FEU-MCNC: 1.08 UG/ML FEU (ref 0–0.5)

## 2025-06-03 PROCEDURE — 3078F DIAST BP <80 MM HG: CPT | Performed by: INTERNAL MEDICINE

## 2025-06-03 PROCEDURE — 85379 FIBRIN DEGRADATION QUANT: CPT | Performed by: INTERNAL MEDICINE

## 2025-06-03 PROCEDURE — 99205 OFFICE O/P NEW HI 60 MIN: CPT | Performed by: INTERNAL MEDICINE

## 2025-06-03 PROCEDURE — 36415 COLL VENOUS BLD VENIPUNCTURE: CPT | Performed by: INTERNAL MEDICINE

## 2025-06-03 PROCEDURE — G2211 COMPLEX E/M VISIT ADD ON: HCPCS | Performed by: INTERNAL MEDICINE

## 2025-06-03 PROCEDURE — 3074F SYST BP LT 130 MM HG: CPT | Performed by: INTERNAL MEDICINE

## 2025-06-03 RX ORDER — BACLOFEN 5 MG/1
TABLET ORAL
COMMUNITY
Start: 2025-03-18 | End: 2025-06-03

## 2025-06-03 RX ORDER — ACETAMINOPHEN 325 MG/1
650 TABLET ORAL
COMMUNITY
Start: 2024-10-31

## 2025-06-03 RX ORDER — EMOLLIENT BASE
CREAM (GRAM) TOPICAL DAILY PRN
COMMUNITY

## 2025-06-03 RX ORDER — CARBOXYMETHYLCELLULOSE SODIUM 5 MG/ML
1-2 SOLUTION/ DROPS OPHTHALMIC
COMMUNITY
Start: 2024-06-27

## 2025-06-03 RX ORDER — CLOTRIMAZOLE 1 %
CREAM (GRAM) TOPICAL
COMMUNITY
Start: 2024-10-31

## 2025-06-03 RX ORDER — OXYCODONE HYDROCHLORIDE 5 MG/1
5-10 TABLET ORAL EVERY 6 HOURS PRN
Qty: 20 TABLET | Refills: 0 | Status: SHIPPED | OUTPATIENT
Start: 2025-06-03

## 2025-06-03 RX ORDER — AMMONIUM LACTATE 12 G/100G
LOTION TOPICAL
COMMUNITY
Start: 2024-10-31

## 2025-06-03 RX ORDER — OXYCODONE HYDROCHLORIDE 5 MG/1
5-10 TABLET ORAL
COMMUNITY
Start: 2025-05-27 | End: 2025-06-06

## 2025-06-03 RX ORDER — FAMOTIDINE 10 MG
10 TABLET ORAL
COMMUNITY
Start: 2024-11-07

## 2025-06-03 NOTE — TELEPHONE ENCOUNTER
Patient will get his D-dimer done this morning at [place]. Pt is scheduled for his Consult Appointment with Dr Neri on 06/24/25.

## 2025-06-03 NOTE — TELEPHONE ENCOUNTER
Left vm stating that PT orders approved, if Edel calls back, please relay provider's message to her     Dodie Allan, RN on 6/3/2025 at 1:06 PM

## 2025-06-03 NOTE — TELEPHONE ENCOUNTER
[facility] Order start of care/eval from the start of home care services    Fax received and placed on PCP's desk

## 2025-06-03 NOTE — PROGRESS NOTES
[address] [address]  Phone: [phone]  Fax: [phone]    The secret of the care of the patient is in caring for the patient.  - Dr. Francis Peabody  ______________________________________________________________________     Date of Service: 6/3/2025  Primary Provider: Reinier Aleman    Patient Care Team:  Reinier Aleman MD as PCP - General (Internal Medicine)  Danae Mcdonald MD as MD (Dermatology)  Pretty Lomax MD as MD (Ophthalmology)  Tabatha Sweeney MD as MD (INTERNAL MEDICINE - ENDOCRINOLOGY, DIABETES & METABOLISM)  Primo Mahan MD as MD (Dermatology)  Eliecer Caraballo MD as Assigned Surgical Provider  Benito Cuellar MD as Assigned Heart and Vascular Provider  Alfonso Hernandez MD as MD (Orthopaedic Surgery)  Bernardo García MD as MD (Orthopaedic Surgery)  Amanda Bojorquez MD as MD (Orthopaedic Surgery)     ______________________________________________________________________     Chief Complaint   Patient presents with    Hospital F/U     05/15/25 [facility] left femur fracture, had fall and ended up in transitional care. Blood clots in legs discovered around same time.           6/3/2025     8:58 AM   Additional Questions   Roomed by KESHAWN Phillips     History of Present Illness-  Karri GUAMAN Jose Antonio, 74 years    Knee Replacement and Fracture  - Had knee replacement surgery on March 10, 2025, in [place].  - Initially did well with a few weeks of physical therapy.  - Fractured the knee while doing yard work, leading to an emergency room visit and transition care at [facility].  Femur fracture.  - Received compression stockings upon discharge.    Blood Clots  - Experienced pain in the left leg, initially unaware of deep vein thrombosis.  - Previous consultations with Doctor Cuellar at [facility]; initial visit showed no findings, but later calcification was found in arteries.  -  ultrasound done at transitional care unit (TCU) [facility] showed calf deep venous thrombosis (DVT).  - Currently on Eliquis, reports improvement in leg pain since starting the medication.    Chronic Pain  - Chronic pain condition began in 2019, with pain moving up one side and then the other.  Both arms.  - Previously managed with gabapentin for about four years, discontinued but recently restarted at the suggestion of a doctor at [facility].  - [facility] nerves on the spine.  Low back.  - low back pain treatment on hold at this time with anticoagulation.    Past Surgical History  - Hip replacements: left hip in , second hip in .  - Right shoulder debridement around , with a follow-up surgery a couple of years later.  - Rotator cuff surgery in .  - Achilles tendon surgery following a rupture after a nonsurgical repair attempt.    Family History  - Mother  due to heart troubles related to rheumatic fever.  - Father  in  at age 73 after a stroke and subsequent heart attack.  - Brother is in good health, living in [place], and is a psychologist.    Social History  - Served in the [employer], later became a .  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  - Has a sister who has a property in [place].    [facility]  - Reports swelling in the leg post-blood clot, which has decreased but still occurs.  - Uses orthotics for back problems, possibly related to an uneven gait.  ______________________________________________________________________     Active Problem List:  Problem List as of 6/3/2025 Reviewed: 6/3/2025  4:33 PM by Reinier Aleman MD         High    Former smoker       Medium    Acute deep vein thrombosis (DVT) of calf muscle vein of left lower extremity (H)    History of total knee arthroplasty, left    Primary osteoarthritis involving multiple joints       Low     "Allergic to bees    Allergic rhinitis    Hyperlipidemia     Current Outpatient Medications   Medication Instructions    acetaminophen (TYLENOL) 650 mg    ammonium lactate (LAC-HYDRIN) 12 % external lotion Apply topically.    apixaban ANTICOAGULANT (ELIQUIS) 5 MG tablet Two pills by mouth twice daily for 7 days then 1 pill by mouth twice daily    Calcium Carbonate-Vitamin D 250-3.125 MG-MCG TABS 1 tablet, Oral    camphor-menthol (DERMASARRA) 0.5-0.5 % external lotion Apply topically.    carboxymethylcellulose PF (REFRESH PLUS) 0.5 % ophthalmic solution 1-2 drops    clotrimazole (LOTRIMIN) 1 % external cream Apply topically.    emollient (VANICREAM) external cream Topical, DAILY PRN    famotidine (PEPCID) 10 mg    gabapentin (NEURONTIN) 600 mg    meloxicam (MOBIC) 7.5 MG tablet PRN    oxyCODONE (ROXICODONE) 5-10 mg    oxyCODONE (ROXICODONE) 5-10 mg, Oral, EVERY 6 HOURS PRN     Social History     Social History Narrative    .        [place] in [place] near his sister.        One son who lives in [place].        Served in the [employer], later became a .        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      ______________________________________________________________________     Wt Readings from Last 3 Encounters:   06/03/25 76.3 kg (168 lb 3.2 oz)   06/13/24 78 kg (172 lb)   04/08/23 76.7 kg (169 lb)     BP Readings from Last 3 Encounters:   06/03/25 128/76   10/08/24 131/79   06/13/24 (!) 141/79     /76   Pulse 56   Temp 97.1  F (36.2  C) (Temporal)   Resp 16   Ht 1.676 m (5' 6\")   Wt 76.3 kg (168 lb 3.2 oz)   SpO2 98%   BMI 27.15 kg/m     The patient is comfortable, no acute distress.  Mood good.  Insight good.  Eyes are nonicteric.  Neck is supple without mass.  No cervical adenopathy.  No thyromegaly. Heart regular rate and rhythm.  Lungs clear to auscultation bilaterally.  Respiratory effort is good.  Extremities no edema. " Abdomen soft, nontender.  No significant edema in the lower extremities at this time.  Walks slow with a cane with the left leg.  ______________________________________________________________________     Results for orders placed or performed in visit on 06/03/25   D dimer quantitative     Status: Abnormal   Result Value Ref Range    D-Dimer Quantitative 1.08 (H) 0.00 - 0.50 ug/mL FEU    Narrative    This D-dimer assay is intended for use in conjunction with a clinical pretest probability assessment model to exclude pulmonary embolism (PE) and deep venous thrombosis (DVT) in outpatients suspected of PE or DVT. The cut-off value is 0.50 ug/mL FEU.    For patients 50 years of age or older, the application of age-adjusted cut-off values for D-Dimer may increase the specificity without significant effect on sensitivity. The literature suggested calculation age adjusted cut-off in ug/L = age in years x 10 ug/L. The results in this laboratory are reported as ug/mL rather than ug/L. The calculation for age adjusted cut off in ug/mL= age in years x 0.01 ug/mL. For example, the cut off for a 76 year old male is 76 x 0.01 ug/mL = 0.76 ug/mL (760 ug/L).    M Carole et al. Age adjusted D-dimer cut-off levels to rule out pulmonary embolism: The ADJUST-PE Study. KIMBERLI 2014;311:9893-8574.; HJ Kiersten et al. Diagnostic accuracy of conventional or age adjusted D-dimer cutoff values in older patients with suspected venous thromboembolism. Systemic review and meta-analysis. BMJ 2013:346:f2492.      ______________________________________________________________________     Diagnoses managed today:    1. Closed fracture of lateral condyle of left femur, initial encounter (H)    2. Deep vein thrombosis (DVT) of calf muscle vein of left lower extremity, unspecified chronicity (H)    3. Former smoker    4. Mixed hyperlipidemia    5. Encounter to establish care with new doctor    6. Pain management    7. Pain of left calf        ______________________________________________________________________     Assessment & Plan  Closed condylar fracture of left femur, initial encounter (H)  - Fracture occurred during yard work after knee replacement surgery.  - Continue with physical therapy. Follow-up with Doctor Hernandez in two weeks.  - Monitor for any complications related to the fracture.    Deep vein thrombosis (DVT) of calf muscle vein of left lower extremity, unspecified chronicity (H)  - DVT likely caused by injury or fracture. Pain from blood clots has decreased with Eliquis.  - Continue Eliquis. Follow-up with Doctor Neri on June 24, 2025, for further evaluation.  - Risks and side effects: Monitor for any bleeding problems, such as persistent epistaxes.    Encounter to establish care with new doctor  - Schedule follow-up appointment in 3-4 weeks to assess progress and pain management.    Pain management  - Pain is most severe at night.  - Refill oxycodone prescription. Recommend using Tylenol instead of ibuprofen while on blood thinner.  - Risks and side effects: Avoid ibuprofen due to increased risk of bleeding while on blood thinner.    Pain of left calf  - Pain has decreased with Eliquis.  - Continue monitoring pain levels.    Mixed hyperlipidemia  - No specific plan mentioned in the transcript, but should be monitored as part of overall health management.    Former smoker  - No specific plan mentioned in the transcript, but smoking cessation should be reinforced as part of health management.    Continue current medications otherwise.  Follow up sooner if issues.    60 minutes or greater was spent today on the patient's care on the day of service.      This includes time for chart preparation, reviewing medical tests done before or during the visit, talking with the patient, review of quality indicators, required documentation, and other elements of care.     No orders of the defined types were placed in this encounter.     Issues  to follow up on:  Follow up pain management with plan to get off of oxycodone.  Update surgery history and preventive care in the future.  Blood work as needed.  May need [facility] records in the future.    Reinier Aleman MD  General Internal Medicine  [facility]    The longitudinal plan of care for the diagnoses and conditions as documented were addressed during this visit. Due to the added complexity in care, I will continue to support Karri in the subsequent management and with ongoing continuity of care.     Return in about 23 days (around 6/26/2025) for follow up visit.     Future Appointments   Date Time Provider Department Center   6/24/2025  3:10 PM Boni Neri MD [facility]   6/26/2025 12:30 PM Reinier Aleman MD [facility]   10/28/2025  1:00 PM [facility]allo [facility]

## 2025-06-03 NOTE — PATIENT INSTRUCTIONS
Future Appointments   Date Time Provider Department Center   6/24/2025  3:10 PM Boni Neri MD [facility]   6/26/2025 12:30 PM [facility]   10/28/2025  1:00 PM [facility]allo [facility]

## 2025-06-03 NOTE — TELEPHONE ENCOUNTER
Hematuria Jamar for orders.    Reinier Aleman MD  General Internal Medicine  [facility]  6/3/2025, 12:31 PM   Hematuria Hematuria Hematuria

## 2025-06-04 ENCOUNTER — MYC MEDICAL ADVICE (OUTPATIENT)
Dept: INTERNAL MEDICINE | Facility: CLINIC | Age: 75
End: 2025-06-04
Payer: MEDICARE

## 2025-06-04 ENCOUNTER — TELEPHONE (OUTPATIENT)
Dept: INTERNAL MEDICINE | Facility: CLINIC | Age: 75
End: 2025-06-04
Payer: MEDICARE

## 2025-06-04 NOTE — TELEPHONE ENCOUNTER
Orders received from [employer] to be reviewed and signed by Provider.       Order number(s):  [id number]      Placed forms on providers desk for review and signature.    Dom Zarate Jr., CMA on 6/4/2025 at 3:57 PM

## 2025-06-05 ENCOUNTER — MEDICAL CORRESPONDENCE (OUTPATIENT)
Dept: HEALTH INFORMATION MANAGEMENT | Facility: CLINIC | Age: 75
End: 2025-06-05
Payer: MEDICARE

## 2025-06-05 ENCOUNTER — TELEPHONE (OUTPATIENT)
Dept: INTERNAL MEDICINE | Facility: CLINIC | Age: 75
End: 2025-06-05
Payer: MEDICARE

## 2025-06-05 PROBLEM — K21.9 GERD (GASTROESOPHAGEAL REFLUX DISEASE): Status: ACTIVE | Noted: 2025-06-05

## 2025-06-05 NOTE — TELEPHONE ENCOUNTER
Orders received from [facility] to be reviewed and signed by Provider.       Order number(s):  [id number]      Placed forms on providers desk for review and signature.    Dom Zarate Jr., CMA on 6/5/2025 at 1:15 PM

## 2025-06-09 ENCOUNTER — MEDICAL CORRESPONDENCE (OUTPATIENT)
Dept: HEALTH INFORMATION MANAGEMENT | Facility: CLINIC | Age: 75
End: 2025-06-09
Payer: MEDICARE

## 2025-06-09 ENCOUNTER — TELEPHONE (OUTPATIENT)
Dept: INTERNAL MEDICINE | Facility: CLINIC | Age: 75
End: 2025-06-09
Payer: MEDICARE

## 2025-06-09 NOTE — TELEPHONE ENCOUNTER
[facility]     Order number(s):  [id number]    Fax received and placed on PCP's desk

## 2025-06-10 ENCOUNTER — RESULTS FOLLOW-UP (OUTPATIENT)
Dept: INTERNAL MEDICINE | Facility: CLINIC | Age: 75
End: 2025-06-10
Payer: MEDICARE

## 2025-06-10 ENCOUNTER — HOSPITAL ENCOUNTER (OUTPATIENT)
Dept: ULTRASOUND IMAGING | Facility: CLINIC | Age: 75
Discharge: HOME | End: 2025-06-10
Attending: INTERNAL MEDICINE
Payer: MEDICARE

## 2025-06-10 ENCOUNTER — TELEPHONE (OUTPATIENT)
Dept: INTERNAL MEDICINE | Facility: CLINIC | Age: 75
End: 2025-06-10
Payer: MEDICARE

## 2025-06-10 DIAGNOSIS — I82.462 DEEP VEIN THROMBOSIS (DVT) OF CALF MUSCLE VEIN OF LEFT LOWER EXTREMITY, UNSPECIFIED CHRONICITY (H): ICD-10-CM

## 2025-06-10 DIAGNOSIS — Z96.652 HISTORY OF TOTAL KNEE ARTHROPLASTY, LEFT: ICD-10-CM

## 2025-06-10 DIAGNOSIS — M79.662 PAIN OF LEFT CALF: ICD-10-CM

## 2025-06-10 PROCEDURE — 93971 EXTREMITY STUDY: CPT | Mod: LT

## 2025-06-10 NOTE — TELEPHONE ENCOUNTER
Home Care is calling regarding an established patient with [facility].  Requesting orders from: Reinier Aleman RN APPROVED: RN able to provide verbal orders.  Home Care will send orders for signature.  RN will close encounter.  Is this a request for a temporary pause in the home care episode?  No    Orders Requested    Social Work  Request for initial certification (first set of orders)   Frequency: 1 more visit on Friday morning to get pt set up with [employer] work.   RN gave verbal order: Yes      Contacts       Contact Date/Time Type Contact Phone/Fax    06/10/2025 12:27 PM CDT Phone (Incoming) [facility] [phone]          Hermila Wilcox RN

## 2025-06-12 ENCOUNTER — TELEPHONE (OUTPATIENT)
Dept: INTERNAL MEDICINE | Facility: CLINIC | Age: 75
End: 2025-06-12
Payer: MEDICARE

## 2025-06-12 NOTE — TELEPHONE ENCOUNTER
[facility]     Order number(s):  [id number]  [id number]  [id number]    Fax received and placed on PCP's desk for return 6/16

## 2025-06-14 ENCOUNTER — HEALTH MAINTENANCE LETTER (OUTPATIENT)
Age: 75
End: 2025-06-14

## 2025-06-16 ENCOUNTER — MEDICAL CORRESPONDENCE (OUTPATIENT)
Dept: HEALTH INFORMATION MANAGEMENT | Facility: CLINIC | Age: 75
End: 2025-06-16
Payer: MEDICARE

## 2025-06-16 DIAGNOSIS — Z53.9 DIAGNOSIS NOT YET DEFINED: Primary | ICD-10-CM

## 2025-06-16 PROCEDURE — G0180 MD CERTIFICATION HHA PATIENT: HCPCS | Performed by: INTERNAL MEDICINE

## 2025-06-17 ENCOUNTER — TRANSFERRED RECORDS (OUTPATIENT)
Dept: HEALTH INFORMATION MANAGEMENT | Facility: CLINIC | Age: 75
End: 2025-06-17
Payer: MEDICARE

## 2025-06-24 ENCOUNTER — OFFICE VISIT (OUTPATIENT)
Dept: OTHER | Facility: CLINIC | Age: 75
End: 2025-06-24
Attending: INTERNAL MEDICINE
Payer: MEDICARE

## 2025-06-24 VITALS
HEART RATE: 54 BPM | OXYGEN SATURATION: 97 % | BODY MASS INDEX: 27.12 KG/M2 | SYSTOLIC BLOOD PRESSURE: 131 MMHG | DIASTOLIC BLOOD PRESSURE: 77 MMHG | WEIGHT: 168 LBS

## 2025-06-24 DIAGNOSIS — I99.8 VASCULAR CALCIFICATION: ICD-10-CM

## 2025-06-24 DIAGNOSIS — E78.5 HYPERLIPIDEMIA LDL GOAL <70: ICD-10-CM

## 2025-06-24 DIAGNOSIS — I82.402 DEEP VEIN THROMBOSIS (DVT) OF LEFT LOWER EXTREMITY, UNSPECIFIED CHRONICITY, UNSPECIFIED VEIN (H): Primary | ICD-10-CM

## 2025-06-24 DIAGNOSIS — Z13.1 SCREENING FOR DIABETES MELLITUS: ICD-10-CM

## 2025-06-24 PROCEDURE — 3075F SYST BP GE 130 - 139MM HG: CPT | Performed by: INTERNAL MEDICINE

## 2025-06-24 PROCEDURE — G2211 COMPLEX E/M VISIT ADD ON: HCPCS | Performed by: INTERNAL MEDICINE

## 2025-06-24 PROCEDURE — 3078F DIAST BP <80 MM HG: CPT | Performed by: INTERNAL MEDICINE

## 2025-06-24 PROCEDURE — G0463 HOSPITAL OUTPT CLINIC VISIT: HCPCS | Performed by: INTERNAL MEDICINE

## 2025-06-24 PROCEDURE — 99205 OFFICE O/P NEW HI 60 MIN: CPT | Performed by: INTERNAL MEDICINE

## 2025-06-24 NOTE — PROGRESS NOTES
[facility]        Patient is here for a consult to discuss blood clots    Pt is currently taking Eliquis.    /77 (BP Location: Right arm, Patient Position: Sitting, Cuff Size: Adult Regular)   Pulse 54   Wt 168 lb (76.2 kg)   SpO2 97%   BMI 27.12 kg/m      The provider has been notified that the patient has no concerns.     Questions patient would like addressed today are: N/A.    Refills are needed: N/A    Has homecare services and agency name:  No     Patient has been identified as a fall risk.    Interventions were completed as noted below:  [x]Exam tables/chairs remained in the lowest position.   []Patient remained in wheelchair.    []Provider requested patient in exam chair.  Patient required assist and use of transfer belt.  [x]Exam room door remained open unless with a provider.  []A bell provided to patient to notify staff if assistance was needed.  [x]Provider notified patient was identified as a fall risk.      Gianna Gramajo MA

## 2025-06-24 NOTE — PROGRESS NOTES
INITIAL VASCULAR MEDICAL ASSESSMENT  REFERRAL SOURCE: Self Referral   REASON FOR CONSULT: for Blood Clots            A/P:      (I82.402) Deep vein thrombosis (DVT) of left lower extremity, unspecified chronicity, unspecified vein (H)  (primary encounter diagnosis)  Comment: No HC w/u indicated as this was provoked in association with the above.   Plan: He should be ACd until three month lisa of 8/27/25. Check d dimer then. As thrombus has resorbed, if d dimer at that time is normal, then stop AC and retest d dimer one month later with RTC to discuss continued AC cessation. Wear knee high compression hosiery. Rx given.         (I99.8) Vascular calcification  Comment: His ABIs were calcified. He asked questions about that and is asx form an arterial persective. Check the below 08/2025, RTC two weeks later.   Plan: Comprehensive metabolic panel, C-Reactive         Protein, High Sensitivity, Hemoglobin A1c,         Lipoprotein (a)            (E78.5) Hyperlipidemia LDL goal <70  Comment: As above.  Plan: LipoFit by NMR            (Z13.1) Screening for diabetes mellitus  Comment: Check the below 08/2025  Plan: Hemoglobin A1c                  The longitudinal care of plan for Karri was addressed during this visit. Due to added complexity of care, we will continue to support Karri Brady  and the subsequent management of these conditions and with ongoing continuity of care for these conditions.       63 minutes total medical care on today's date.      HPI: Karri Brady is a 74 year old male with a h/o having undergone a Lt RACHELLE on 03/10/2025 in [place]  due to OA. He was making progress but fell doing yard work sustaining a left lateral femoral condyle fracture. It was too painful to go home so he was sent to [facility]. No surgery was performed. Transferred to [facility] for further care and rehabilitation.     The week of 5/20/2025 he had the onset of pain in the left proximal lateral calf area which got worse especially  over the weekend. Venous Doppler ultrasound done 5/27/2025 at an outside facility a(no images or primary source records available) did show multiple distal DVTs. He was placed on Eliquis 10 mg PO BID times seven days then 5 mg PO BID thereafter. He asked for an appointment to see me about this. It is his first lifetime VTE event. It was provoked in association with the Lt RACHELLE. ASA was used as DVT prophylaxis post operatively. The patient presents today to address need for HC w/u and to address duration of AC. 6/10/25 duplex reveals no residual DVT. [facility] and as [facility] per the patient. He had a colonoscopy ten years ago. He has already been seen by a hematologist as well. He has been given a prescription for compression hosiery.     Review Of Systems  Skin: negative  Eyes: negative  Ears/Nose/Throat: negative  Respiratory: No shortness of breath, dyspnea on exertion, cough, or hemoptysis  Cardiovascular: negative  Gastrointestinal: negative  Genitourinary: negative  Musculoskeletal: as above  Neurologic: negative  Psychiatric: negative  Hematologic/Lymphatic/Immunologic: negative  Endocrine: negative      PAST MEDICAL HISTORY:                  Past Medical History:   Diagnosis Date    Hyperlipidemia     Impaired glucose tolerance     Injury, other and unspecified, unspecified site 1998    Fracture left distal radius,Achilles tendon rupture.    Strabismus     Thyroid nodule     Trigger finger     mutliple operations for this       PAST SURGICAL HISTORY:                  Past Surgical History:   Procedure Laterality Date    ARTHROPLASTY MINIMALLY INVASIVE HIP  04/19/2013    Procedure: ARTHROPLASTY MINIMALLY INVASIVE HIP;  Right Two Incision Total Hip Arthroplasty;  Surgeon: Steve Lopez MD;  Location:  OR    [facility] ARTHROSCOP,DIAGNOSTIC      Right shoulder    HC CORRECT BUNION,SIMPLE      Bunionectomy, NOS    HC REMOVAL HEEL SPUR, CALCANEUS      HC REPAIR ACHILLES TENDON,PRIMARY       HC REPAIR ACHILLES TENDON,PRIMARY      Description: Primary Repair Of Ruptured Achilles Tendon;  Recorded: 04/16/2014;    HC REPAIR OF NASAL SEPTUM      JOINT REPLACEMENT Bilateral     left hip replacement      2006 by Dr Lopez    OTHER SURGICAL HISTORY      rotator cuff tear    RELEASE TRIGGER FINGER      REMOVAL OF SPERM DUCT(S)  1985    Vasectomy    SHOULDER SURGERY      STRABISMUS SURGERY      TONSILLECTOMY  1960    [facility] LEG/ANKLE SURGERY PROC UNLISTED      Ankle arthroscopy, diagnostic    [facility] SHOULDER SURG PROC UNLISTED      Shoulder Procedure Unlisted       CURRENT MEDICATIONS:                  Current Outpatient Medications   Medication Sig Dispense Refill    acetaminophen (TYLENOL) 325 MG tablet Take 650 mg by mouth.      ammonium lactate (LAC-HYDRIN) 12 % external lotion Apply topically.      apixaban ANTICOAGULANT (ELIQUIS) 5 MG tablet Two pills by mouth twice daily for 7 days then 1 pill by mouth twice daily      Calcium Carbonate-Vitamin D 250-3.125 MG-MCG TABS Take 1 tablet by mouth.      camphor-menthol (DERMASARRA) 0.5-0.5 % external lotion Apply topically.      carboxymethylcellulose PF (REFRESH PLUS) 0.5 % ophthalmic solution 1-2 drops.      clotrimazole (LOTRIMIN) 1 % external cream Apply topically.      emollient (VANICREAM) external cream Apply topically daily as needed.      famotidine (PEPCID) 10 MG tablet Take 10 mg by mouth.      gabapentin (NEURONTIN) 300 MG capsule 600 mg Taking 600 mg daily      meloxicam (MOBIC) 7.5 MG tablet as needed      oxyCODONE (ROXICODONE) 5 MG tablet Take 1-2 tablets (5-10 mg) by mouth every 6 hours as needed for severe pain. 20 tablet 0       ALLERGIES:                  Allergies   Allergen Reactions    Bee Venom Swelling    No Known Drug Allergy        SOCIAL HISTORY:                  Social History     Socioeconomic History    Marital status:      Spouse name: Not on file    Number of children: 1    Years of education: Not on file    Highest education  level: Not on file   Occupational History     Employer: [employer]     Comment:    Tobacco Use    Smoking status: Former     Current packs/day: 2.00     Average packs/day: 2.0 packs/day for 5.0 years (10.0 ttl pk-yrs)     Types: Cigarettes    Smokeless tobacco: Never    Tobacco comments:     quit at age 22   Substance and Sexual Activity    Alcohol use: Yes     Comment: Alcoholic Drinks/day: rare    Drug use: No    Sexual activity: Not on file   Other Topics Concern    Parent/sibling w/ CABG, MI or angioplasty before 65F 55M? No   Social History Narrative    .        [place] in [place] near his sister.        One son who lives in [place].        Served in the [employer], later became a .        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     Social Drivers of Health     Financial Resource Strain: Low Risk  (2/1/2025)    Received from [employer]    Financial Resource Strain     Difficulty of Paying Living Expenses: 3     Difficulty of Paying Living Expenses: Not on file   Food Insecurity: No Food Insecurity (5/15/2025)    Received from [employer]    Hunger Vital Sign     Worried About Running Out of Food in the Last Year: Never true     Ran Out of Food in the Last Year: Never true   Transportation Needs: No Transportation Needs (5/15/2025)    Received from [employer]    PRAPARE - Transportation     Lack of Transportation (Medical): No     Lack of Transportation (Non-Medical): No   Physical Activity: Not on file   Stress: Not on file   Social Connections: Socially Integrated (9/1/2024)    Received from [employer]    Social Connections     Do you often feel lonely or isolated from those around you?: 0   Interpersonal Safety: Low Risk  (6/3/2025)    Interpersonal Safety     Do you feel physically and emotionally safe where you currently live?: Yes      Within the past 12 months, have you been hit, slapped, kicked or otherwise physically hurt by someone?: No     Within the past 12 months, have you been humiliated or emotionally abused in other ways by your partner or ex-partner?: No   Housing Stability: Unknown (5/15/2025)    Received from [employer] Stability Vital Sign     Unable to Pay for Housing in the Last Year: No     Number of Times Moved in the Last Year: Not on file     Homeless in the Last Year: No       FAMILY HISTORY:                   Family History   Problem Relation Age of Onset    Rheumatoid Arthritis Mother     Rheumatic fever Mother     Cerebrovascular Disease Father     Heart Disease Father     Thyroid Disease Sister         thyroidectomy    No Known Problems Brother     Arthritis Maternal Grandmother     Cancer Maternal Grandfather         bone cancer    Cardiovascular Maternal Grandfather     Amblyopia Son     Glaucoma No family hx of     Macular Degeneration No family hx of     Diabetes No family hx of          Physical exam Reveals:    O/P: WNL  HEENT: WNL  NECK: No JVD, thyromegaly, or lymphadenopathy  HEART: RRR, no murmurs, gallops, or rubs  LUNGS: CTA bilaterally without rales, wheezes, or rhonchi  GI: NABS, nondistended, nontender, soft  EXT:without cyanosis, clubbing; tr LLE edema  NEURO: nonfocal  : no flank tenderness         All relevant labs and imaging reviewed by myself on today's date.

## 2025-06-27 PROBLEM — S72.422A: Status: ACTIVE | Noted: 2025-06-27

## 2025-07-08 ENCOUNTER — TRANSFERRED RECORDS (OUTPATIENT)
Dept: HEALTH INFORMATION MANAGEMENT | Facility: CLINIC | Age: 75
End: 2025-07-08
Payer: MEDICARE

## 2025-08-26 ENCOUNTER — LAB (OUTPATIENT)
Dept: LAB | Facility: CLINIC | Age: 75
End: 2025-08-26
Payer: MEDICARE

## 2025-08-26 DIAGNOSIS — Z13.1 SCREENING FOR DIABETES MELLITUS: ICD-10-CM

## 2025-08-26 DIAGNOSIS — I82.402 DEEP VEIN THROMBOSIS (DVT) OF LEFT LOWER EXTREMITY, UNSPECIFIED CHRONICITY, UNSPECIFIED VEIN (H): ICD-10-CM

## 2025-08-26 DIAGNOSIS — I99.8 VASCULAR CALCIFICATION: ICD-10-CM

## 2025-08-26 DIAGNOSIS — E78.5 HYPERLIPIDEMIA LDL GOAL <70: ICD-10-CM

## 2025-08-26 LAB
ALBUMIN SERPL BCG-MCNC: 4.5 G/DL (ref 3.5–5.2)
ALP SERPL-CCNC: 73 U/L (ref 40–150)
ALT SERPL W P-5'-P-CCNC: 23 U/L (ref 0–70)
ANION GAP SERPL CALCULATED.3IONS-SCNC: 12 MMOL/L (ref 7–15)
APO A-I SERPL-MCNC: 22 MG/DL
AST SERPL W P-5'-P-CCNC: 28 U/L (ref 0–45)
BILIRUB SERPL-MCNC: 0.4 MG/DL
BUN SERPL-MCNC: 16.2 MG/DL (ref 8–23)
CALCIUM SERPL-MCNC: 9.5 MG/DL (ref 8.8–10.4)
CHLORIDE SERPL-SCNC: 105 MMOL/L (ref 98–107)
CREAT SERPL-MCNC: 0.9 MG/DL (ref 0.67–1.17)
CRP SERPL HS-MCNC: 0.45 MG/L
D DIMER PPP FEU-MCNC: 0.34 UG/ML FEU (ref 0–0.5)
EGFRCR SERPLBLD CKD-EPI 2021: 90 ML/MIN/1.73M2
EST. AVERAGE GLUCOSE BLD GHB EST-MCNC: 105 MG/DL
GLUCOSE SERPL-MCNC: 98 MG/DL (ref 70–99)
HBA1C MFR BLD: 5.3 % (ref 0–5.6)
HCO3 SERPL-SCNC: 24 MMOL/L (ref 22–29)
POTASSIUM SERPL-SCNC: 4.2 MMOL/L (ref 3.4–5.3)
PROT SERPL-MCNC: 6.9 G/DL (ref 6.4–8.3)
SODIUM SERPL-SCNC: 141 MMOL/L (ref 135–145)

## 2025-08-26 PROCEDURE — 85379 FIBRIN DEGRADATION QUANT: CPT

## 2025-08-26 PROCEDURE — 86141 C-REACTIVE PROTEIN HS: CPT

## 2025-08-26 PROCEDURE — 36415 COLL VENOUS BLD VENIPUNCTURE: CPT

## 2025-08-26 PROCEDURE — 83036 HEMOGLOBIN GLYCOSYLATED A1C: CPT

## 2025-08-26 PROCEDURE — 80053 COMPREHEN METABOLIC PANEL: CPT

## 2025-08-26 PROCEDURE — 83695 ASSAY OF LIPOPROTEIN(A): CPT

## 2025-08-26 PROCEDURE — 83704 LIPOPROTEIN BLD QUAN PART: CPT | Mod: 90

## 2025-08-26 PROCEDURE — 99000 SPECIMEN HANDLING OFFICE-LAB: CPT

## 2025-08-26 PROCEDURE — 80061 LIPID PANEL: CPT | Mod: 59

## 2025-08-27 ENCOUNTER — PATIENT OUTREACH (OUTPATIENT)
Dept: CARE COORDINATION | Facility: CLINIC | Age: 75
End: 2025-08-27
Payer: MEDICARE

## 2025-08-30 LAB
CHOLEST SERPL-MCNC: 145 MG/DL
HDL SERPL QN: 8.9 NM
HDL SERPL-SCNC: 35.5 UMOL/L
HDLC SERPL-MCNC: 46 MG/DL
HLD.LARGE SERPL-SCNC: 5.1 UMOL/L
LDL SERPL QN: 20.6 NM
LDL SERPL-SCNC: 1071 NMOL/L
LDL SMALL SERPL-SCNC: 566 NMOL/L
LDLC SERPL CALC-MCNC: 88 MG/DL
PATHOLOGY STUDY: ABNORMAL
TRIGL SERPL-MCNC: 53 MG/DL
VLDL LARGE SERPL-SCNC: <1.5 NMOL/L
VLDL SERPL QN: 48 NM